# Patient Record
Sex: FEMALE | Race: WHITE | NOT HISPANIC OR LATINO | Employment: OTHER | URBAN - METROPOLITAN AREA
[De-identification: names, ages, dates, MRNs, and addresses within clinical notes are randomized per-mention and may not be internally consistent; named-entity substitution may affect disease eponyms.]

---

## 2024-10-05 ENCOUNTER — HOSPITAL ENCOUNTER (INPATIENT)
Facility: HOSPITAL | Age: 84
End: 2024-10-05
Attending: EMERGENCY MEDICINE | Admitting: FAMILY MEDICINE
Payer: MEDICARE

## 2024-10-05 ENCOUNTER — APPOINTMENT (OUTPATIENT)
Dept: RADIOLOGY | Facility: HOSPITAL | Age: 84
End: 2024-10-05
Payer: MEDICARE

## 2024-10-05 ENCOUNTER — APPOINTMENT (OUTPATIENT)
Dept: CARDIOLOGY | Facility: HOSPITAL | Age: 84
End: 2024-10-05
Payer: MEDICARE

## 2024-10-05 DIAGNOSIS — J18.9 PNEUMONIA, UNSPECIFIED ORGANISM: Primary | ICD-10-CM

## 2024-10-05 PROBLEM — A80.9 POLIO (HHS-HCC): Status: ACTIVE | Noted: 2024-10-05

## 2024-10-05 PROBLEM — M97.9XXA FRACTURE OF BONE ADJACENT TO PROSTHESIS: Status: ACTIVE | Noted: 2024-10-05

## 2024-10-05 PROBLEM — K21.9 GASTROESOPHAGEAL REFLUX DISEASE WITHOUT ESOPHAGITIS: Status: ACTIVE | Noted: 2021-01-25

## 2024-10-05 PROBLEM — D64.9 ANEMIA, UNSPECIFIED: Status: ACTIVE | Noted: 2021-06-17

## 2024-10-05 PROBLEM — F33.9 MAJOR DEPRESSIVE DISORDER, RECURRENT, UNSPECIFIED: Status: ACTIVE | Noted: 2021-06-17

## 2024-10-05 PROBLEM — M05.9 RHEUMATOID ARTHRITIS WITH RHEUMATOID FACTOR: Status: ACTIVE | Noted: 2021-06-17

## 2024-10-05 PROBLEM — I10 HYPERTENSION: Status: ACTIVE | Noted: 2023-03-08

## 2024-10-05 PROBLEM — E78.5 HYPERLIPIDEMIA: Status: ACTIVE | Noted: 2023-03-08

## 2024-10-05 PROBLEM — M19.90 OSTEOARTHRITIS: Status: ACTIVE | Noted: 2022-03-23

## 2024-10-05 LAB
ALBUMIN SERPL BCP-MCNC: 4.6 G/DL (ref 3.4–5)
ALP SERPL-CCNC: 60 U/L (ref 33–136)
ALT SERPL W P-5'-P-CCNC: 23 U/L (ref 7–45)
ANION GAP SERPL CALC-SCNC: 16 MMOL/L (ref 10–20)
APPEARANCE UR: CLEAR
AST SERPL W P-5'-P-CCNC: 60 U/L (ref 9–39)
BASOPHILS # BLD AUTO: 0.01 X10*3/UL (ref 0–0.1)
BASOPHILS NFR BLD AUTO: 0.1 %
BILIRUB SERPL-MCNC: 0.6 MG/DL (ref 0–1.2)
BILIRUB UR STRIP.AUTO-MCNC: NEGATIVE MG/DL
BUN SERPL-MCNC: 16 MG/DL (ref 6–23)
CALCIUM SERPL-MCNC: 9.1 MG/DL (ref 8.6–10.3)
CHLORIDE SERPL-SCNC: 100 MMOL/L (ref 98–107)
CO2 SERPL-SCNC: 23 MMOL/L (ref 21–32)
COLOR UR: YELLOW
CREAT SERPL-MCNC: 1.06 MG/DL (ref 0.5–1.05)
EGFRCR SERPLBLD CKD-EPI 2021: 52 ML/MIN/1.73M*2
EOSINOPHIL # BLD AUTO: 0.03 X10*3/UL (ref 0–0.4)
EOSINOPHIL NFR BLD AUTO: 0.3 %
ERYTHROCYTE [DISTWIDTH] IN BLOOD BY AUTOMATED COUNT: 13.2 % (ref 11.5–14.5)
FLUAV RNA RESP QL NAA+PROBE: NOT DETECTED
FLUBV RNA RESP QL NAA+PROBE: NOT DETECTED
GLUCOSE SERPL-MCNC: 127 MG/DL (ref 74–99)
GLUCOSE UR STRIP.AUTO-MCNC: NORMAL MG/DL
HCT VFR BLD AUTO: 39.3 % (ref 36–46)
HGB BLD-MCNC: 13.2 G/DL (ref 12–16)
HOLD SPECIMEN: NORMAL
IMM GRANULOCYTES # BLD AUTO: 0.04 X10*3/UL (ref 0–0.5)
IMM GRANULOCYTES NFR BLD AUTO: 0.4 % (ref 0–0.9)
KETONES UR STRIP.AUTO-MCNC: ABNORMAL MG/DL
LACTATE SERPL-SCNC: 1.9 MMOL/L (ref 0.4–2)
LACTATE SERPL-SCNC: 2.4 MMOL/L (ref 0.4–2)
LEUKOCYTE ESTERASE UR QL STRIP.AUTO: NEGATIVE
LYMPHOCYTES # BLD AUTO: 0.52 X10*3/UL (ref 0.8–3)
LYMPHOCYTES NFR BLD AUTO: 5.6 %
MCH RBC QN AUTO: 32.8 PG (ref 26–34)
MCHC RBC AUTO-ENTMCNC: 33.6 G/DL (ref 32–36)
MCV RBC AUTO: 98 FL (ref 80–100)
MONOCYTES # BLD AUTO: 0.79 X10*3/UL (ref 0.05–0.8)
MONOCYTES NFR BLD AUTO: 8.5 %
MUCOUS THREADS #/AREA URNS AUTO: NORMAL /LPF
NEUTROPHILS # BLD AUTO: 7.89 X10*3/UL (ref 1.6–5.5)
NEUTROPHILS NFR BLD AUTO: 85.1 %
NITRITE UR QL STRIP.AUTO: NEGATIVE
NRBC BLD-RTO: 0 /100 WBCS (ref 0–0)
PH UR STRIP.AUTO: 5.5 [PH]
PLATELET # BLD AUTO: 166 X10*3/UL (ref 150–450)
POTASSIUM SERPL-SCNC: 4.7 MMOL/L (ref 3.5–5.3)
PROT SERPL-MCNC: 8 G/DL (ref 6.4–8.2)
PROT UR STRIP.AUTO-MCNC: ABNORMAL MG/DL
RBC # BLD AUTO: 4.03 X10*6/UL (ref 4–5.2)
RBC # UR STRIP.AUTO: ABNORMAL /UL
RBC #/AREA URNS AUTO: NORMAL /HPF
SARS-COV-2 RNA RESP QL NAA+PROBE: NOT DETECTED
SODIUM SERPL-SCNC: 134 MMOL/L (ref 136–145)
SP GR UR STRIP.AUTO: 1.02
UROBILINOGEN UR STRIP.AUTO-MCNC: NORMAL MG/DL
WBC # BLD AUTO: 9.3 X10*3/UL (ref 4.4–11.3)
WBC #/AREA URNS AUTO: NORMAL /HPF

## 2024-10-05 PROCEDURE — 96361 HYDRATE IV INFUSION ADD-ON: CPT

## 2024-10-05 PROCEDURE — 2500000004 HC RX 250 GENERAL PHARMACY W/ HCPCS (ALT 636 FOR OP/ED): Performed by: EMERGENCY MEDICINE

## 2024-10-05 PROCEDURE — 36415 COLL VENOUS BLD VENIPUNCTURE: CPT | Performed by: EMERGENCY MEDICINE

## 2024-10-05 PROCEDURE — 96374 THER/PROPH/DIAG INJ IV PUSH: CPT

## 2024-10-05 PROCEDURE — 2500000001 HC RX 250 WO HCPCS SELF ADMINISTERED DRUGS (ALT 637 FOR MEDICARE OP): Performed by: EMERGENCY MEDICINE

## 2024-10-05 PROCEDURE — 85025 COMPLETE CBC W/AUTO DIFF WBC: CPT | Performed by: EMERGENCY MEDICINE

## 2024-10-05 PROCEDURE — 71045 X-RAY EXAM CHEST 1 VIEW: CPT

## 2024-10-05 PROCEDURE — 2500000001 HC RX 250 WO HCPCS SELF ADMINISTERED DRUGS (ALT 637 FOR MEDICARE OP)

## 2024-10-05 PROCEDURE — 87040 BLOOD CULTURE FOR BACTERIA: CPT | Mod: PORLAB | Performed by: EMERGENCY MEDICINE

## 2024-10-05 PROCEDURE — 80053 COMPREHEN METABOLIC PANEL: CPT | Performed by: EMERGENCY MEDICINE

## 2024-10-05 PROCEDURE — 71045 X-RAY EXAM CHEST 1 VIEW: CPT | Mod: FOREIGN READ | Performed by: RADIOLOGY

## 2024-10-05 PROCEDURE — 1200000002 HC GENERAL ROOM WITH TELEMETRY DAILY

## 2024-10-05 PROCEDURE — 83036 HEMOGLOBIN GLYCOSYLATED A1C: CPT

## 2024-10-05 PROCEDURE — 87899 AGENT NOS ASSAY W/OPTIC: CPT | Mod: PORLAB

## 2024-10-05 PROCEDURE — 87502 INFLUENZA DNA AMP PROBE: CPT | Performed by: EMERGENCY MEDICINE

## 2024-10-05 PROCEDURE — 93005 ELECTROCARDIOGRAM TRACING: CPT

## 2024-10-05 PROCEDURE — 87635 SARS-COV-2 COVID-19 AMP PRB: CPT | Performed by: EMERGENCY MEDICINE

## 2024-10-05 PROCEDURE — 74177 CT ABD & PELVIS W/CONTRAST: CPT

## 2024-10-05 PROCEDURE — 84075 ASSAY ALKALINE PHOSPHATASE: CPT | Performed by: EMERGENCY MEDICINE

## 2024-10-05 PROCEDURE — 74177 CT ABD & PELVIS W/CONTRAST: CPT | Mod: FOREIGN READ | Performed by: RADIOLOGY

## 2024-10-05 PROCEDURE — 71260 CT THORAX DX C+: CPT | Mod: FOREIGN READ | Performed by: RADIOLOGY

## 2024-10-05 PROCEDURE — 2550000001 HC RX 255 CONTRASTS: Performed by: EMERGENCY MEDICINE

## 2024-10-05 PROCEDURE — 83605 ASSAY OF LACTIC ACID: CPT | Performed by: EMERGENCY MEDICINE

## 2024-10-05 PROCEDURE — 99223 1ST HOSP IP/OBS HIGH 75: CPT

## 2024-10-05 PROCEDURE — 2500000004 HC RX 250 GENERAL PHARMACY W/ HCPCS (ALT 636 FOR OP/ED)

## 2024-10-05 PROCEDURE — 81001 URINALYSIS AUTO W/SCOPE: CPT | Performed by: EMERGENCY MEDICINE

## 2024-10-05 PROCEDURE — 99285 EMERGENCY DEPT VISIT HI MDM: CPT | Mod: 25

## 2024-10-05 PROCEDURE — 87449 NOS EACH ORGANISM AG IA: CPT | Mod: PORLAB

## 2024-10-05 RX ORDER — ENOXAPARIN SODIUM 100 MG/ML
40 INJECTION SUBCUTANEOUS EVERY 24 HOURS
Status: DISPENSED | OUTPATIENT
Start: 2024-10-05

## 2024-10-05 RX ORDER — PANTOPRAZOLE SODIUM 40 MG/1
40 TABLET, DELAYED RELEASE ORAL
Status: DISPENSED | OUTPATIENT
Start: 2024-10-06

## 2024-10-05 RX ORDER — ONDANSETRON HYDROCHLORIDE 2 MG/ML
4 INJECTION, SOLUTION INTRAVENOUS EVERY 6 HOURS PRN
Status: ACTIVE | OUTPATIENT
Start: 2024-10-05

## 2024-10-05 RX ORDER — POLYETHYLENE GLYCOL 3350 17 G/17G
17 POWDER, FOR SOLUTION ORAL DAILY PRN
Status: ACTIVE | OUTPATIENT
Start: 2024-10-05

## 2024-10-05 RX ORDER — GUAIFENESIN 600 MG/1
600 TABLET, EXTENDED RELEASE ORAL EVERY 12 HOURS PRN
Status: ACTIVE | OUTPATIENT
Start: 2024-10-05

## 2024-10-05 RX ORDER — PANTOPRAZOLE SODIUM 40 MG/10ML
40 INJECTION, POWDER, LYOPHILIZED, FOR SOLUTION INTRAVENOUS
Status: DISPENSED | OUTPATIENT
Start: 2024-10-06

## 2024-10-05 RX ORDER — IPRATROPIUM BROMIDE AND ALBUTEROL SULFATE 2.5; .5 MG/3ML; MG/3ML
3 SOLUTION RESPIRATORY (INHALATION) EVERY 2 HOUR PRN
Status: ACTIVE | OUTPATIENT
Start: 2024-10-05

## 2024-10-05 RX ORDER — ATORVASTATIN CALCIUM 10 MG/1
10 TABLET, FILM COATED ORAL DAILY
Status: ON HOLD | COMMUNITY
Start: 2024-08-19

## 2024-10-05 RX ORDER — MIRTAZAPINE 15 MG/1
7.5 TABLET, FILM COATED ORAL NIGHTLY
Status: DISPENSED | OUTPATIENT
Start: 2024-10-05

## 2024-10-05 RX ORDER — UPADACITINIB 15 MG/1
1 TABLET, EXTENDED RELEASE ORAL DAILY
Status: ON HOLD | COMMUNITY
Start: 2021-03-01

## 2024-10-05 RX ORDER — TALC
3 POWDER (GRAM) TOPICAL NIGHTLY PRN
Status: DISPENSED | OUTPATIENT
Start: 2024-10-05

## 2024-10-05 RX ORDER — MEMANTINE HYDROCHLORIDE 5 MG/1
5 TABLET ORAL DAILY
Status: DISPENSED | OUTPATIENT
Start: 2024-10-05

## 2024-10-05 RX ORDER — IPRATROPIUM BROMIDE AND ALBUTEROL SULFATE 2.5; .5 MG/3ML; MG/3ML
3 SOLUTION RESPIRATORY (INHALATION)
Status: DISCONTINUED | OUTPATIENT
Start: 2024-10-05 | End: 2024-10-05

## 2024-10-05 RX ORDER — ATORVASTATIN CALCIUM 10 MG/1
10 TABLET, FILM COATED ORAL NIGHTLY
Status: DISPENSED | OUTPATIENT
Start: 2024-10-05

## 2024-10-05 RX ORDER — DONEPEZIL HYDROCHLORIDE 10 MG/1
10 TABLET, FILM COATED ORAL NIGHTLY
Status: ON HOLD | COMMUNITY

## 2024-10-05 RX ORDER — ACETAMINOPHEN 325 MG/1
650 TABLET ORAL ONCE
Status: COMPLETED | OUTPATIENT
Start: 2024-10-05 | End: 2024-10-05

## 2024-10-05 RX ORDER — MIRTAZAPINE 15 MG/1
15 TABLET, FILM COATED ORAL NIGHTLY
Status: ON HOLD | COMMUNITY
Start: 2024-08-19

## 2024-10-05 RX ORDER — ACETAMINOPHEN 325 MG/1
650 TABLET ORAL EVERY 4 HOURS PRN
Status: DISPENSED | OUTPATIENT
Start: 2024-10-05

## 2024-10-05 RX ORDER — MEMANTINE HYDROCHLORIDE 5 MG/1
5 TABLET ORAL DAILY
Status: ON HOLD | COMMUNITY

## 2024-10-05 RX ADMIN — AZITHROMYCIN MONOHYDRATE 500 MG: 500 INJECTION, POWDER, LYOPHILIZED, FOR SOLUTION INTRAVENOUS at 15:14

## 2024-10-05 RX ADMIN — ENOXAPARIN SODIUM 40 MG: 40 INJECTION SUBCUTANEOUS at 21:51

## 2024-10-05 RX ADMIN — Medication 3 MG: at 21:49

## 2024-10-05 RX ADMIN — ATORVASTATIN CALCIUM 10 MG: 10 TABLET, FILM COATED ORAL at 21:49

## 2024-10-05 RX ADMIN — ACETAMINOPHEN 650 MG: 325 TABLET ORAL at 10:58

## 2024-10-05 RX ADMIN — MIRTAZAPINE 7.5 MG: 15 TABLET, FILM COATED ORAL at 21:49

## 2024-10-05 RX ADMIN — IOHEXOL 75 ML: 350 INJECTION, SOLUTION INTRAVENOUS at 11:56

## 2024-10-05 RX ADMIN — MEMANTINE 5 MG: 5 TABLET ORAL at 21:51

## 2024-10-05 RX ADMIN — SODIUM CHLORIDE 1000 ML: 9 INJECTION, SOLUTION INTRAVENOUS at 07:38

## 2024-10-05 RX ADMIN — DOXYCYCLINE 100 MG: 100 INJECTION, POWDER, LYOPHILIZED, FOR SOLUTION INTRAVENOUS at 21:48

## 2024-10-05 SDOH — ECONOMIC STABILITY: TRANSPORTATION INSECURITY
IN THE PAST 12 MONTHS, HAS THE LACK OF TRANSPORTATION KEPT YOU FROM MEDICAL APPOINTMENTS OR FROM GETTING MEDICATIONS?: PATIENT UNABLE TO ANSWER

## 2024-10-05 SDOH — ECONOMIC STABILITY: INCOME INSECURITY
IN THE LAST 12 MONTHS, WAS THERE A TIME WHEN YOU WERE NOT ABLE TO PAY THE MORTGAGE OR RENT ON TIME?: PATIENT UNABLE TO ANSWER

## 2024-10-05 SDOH — ECONOMIC STABILITY: TRANSPORTATION INSECURITY
IN THE PAST 12 MONTHS, HAS LACK OF TRANSPORTATION KEPT YOU FROM MEETINGS, WORK, OR FROM GETTING THINGS NEEDED FOR DAILY LIVING?: PATIENT UNABLE TO ANSWER

## 2024-10-05 SDOH — HEALTH STABILITY: PHYSICAL HEALTH: ON AVERAGE, HOW MANY MINUTES DO YOU ENGAGE IN EXERCISE AT THIS LEVEL?: PATIENT UNABLE TO ANSWER

## 2024-10-05 SDOH — ECONOMIC STABILITY: FOOD INSECURITY
WITHIN THE PAST 12 MONTHS, YOU WORRIED THAT YOUR FOOD WOULD RUN OUT BEFORE YOU GOT MONEY TO BUY MORE.: PATIENT UNABLE TO ANSWER

## 2024-10-05 SDOH — ECONOMIC STABILITY: TRANSPORTATION INSECURITY
IN THE PAST 12 MONTHS, HAS LACK OF TRANSPORTATION KEPT YOU FROM MEDICAL APPOINTMENTS OR FROM GETTING MEDICATIONS?: PATIENT UNABLE TO ANSWER

## 2024-10-05 SDOH — SOCIAL STABILITY: SOCIAL INSECURITY
WITHIN THE LAST YEAR, HAVE TO BEEN RAPED OR FORCED TO HAVE ANY KIND OF SEXUAL ACTIVITY BY YOUR PARTNER OR EX-PARTNER?: PATIENT UNABLE TO ANSWER

## 2024-10-05 SDOH — ECONOMIC STABILITY: FOOD INSECURITY
HOW HARD IS IT FOR YOU TO PAY FOR THE VERY BASICS LIKE FOOD, HOUSING, MEDICAL CARE, AND HEATING?: PATIENT UNABLE TO ANSWER

## 2024-10-05 SDOH — ECONOMIC STABILITY: HOUSING INSECURITY: IN THE PAST 12 MONTHS, HOW MANY TIMES HAVE YOU MOVED WHERE YOU WERE LIVING?: 0

## 2024-10-05 SDOH — SOCIAL STABILITY: SOCIAL INSECURITY
WITHIN THE LAST YEAR, HAVE YOU BEEN KICKED, HIT, SLAPPED, OR OTHERWISE PHYSICALLY HURT BY YOUR PARTNER OR EX-PARTNER?: PATIENT UNABLE TO ANSWER

## 2024-10-05 SDOH — ECONOMIC STABILITY: FOOD INSECURITY
WITHIN THE PAST 12 MONTHS, YOU WORRIED THAT YOUR FOOD WOULD RUN OUT BEFORE YOU GOT THE MONEY TO BUY MORE.: PATIENT UNABLE TO ANSWER

## 2024-10-05 SDOH — ECONOMIC STABILITY: INCOME INSECURITY
IN THE PAST 12 MONTHS HAS THE ELECTRIC, GAS, OIL, OR WATER COMPANY THREATENED TO SHUT OFF SERVICES IN YOUR HOME?: PATIENT UNABLE TO ANSWER

## 2024-10-05 SDOH — ECONOMIC STABILITY: HOUSING INSECURITY: AT ANY TIME IN THE PAST 12 MONTHS, WERE YOU HOMELESS OR LIVING IN A SHELTER (INCLUDING NOW)?: PATIENT UNABLE TO ANSWER

## 2024-10-05 SDOH — ECONOMIC STABILITY: FOOD INSECURITY
WITHIN THE PAST 12 MONTHS, THE FOOD YOU BOUGHT JUST DIDN'T LAST AND YOU DIDN'T HAVE MONEY TO GET MORE.: PATIENT UNABLE TO ANSWER

## 2024-10-05 SDOH — SOCIAL STABILITY: SOCIAL INSECURITY: WITHIN THE LAST YEAR, HAVE YOU BEEN AFRAID OF YOUR PARTNER OR EX-PARTNER?: PATIENT UNABLE TO ANSWER

## 2024-10-05 SDOH — SOCIAL STABILITY: SOCIAL INSECURITY: HAVE YOU HAD THOUGHTS OF HARMING ANYONE ELSE?: NO

## 2024-10-05 SDOH — SOCIAL STABILITY: SOCIAL INSECURITY
WITHIN THE LAST YEAR, HAVE YOU BEEN HUMILIATED OR EMOTIONALLY ABUSED IN OTHER WAYS BY YOUR PARTNER OR EX-PARTNER?: PATIENT UNABLE TO ANSWER

## 2024-10-05 SDOH — HEALTH STABILITY: MENTAL HEALTH
STRESS IS WHEN SOMEONE FEELS TENSE, NERVOUS, ANXIOUS, OR CAN'T SLEEP AT NIGHT BECAUSE THEIR MIND IS TROUBLED. HOW STRESSED ARE YOU?: PATIENT UNABLE TO ANSWER

## 2024-10-05 SDOH — HEALTH STABILITY: PHYSICAL HEALTH
ON AVERAGE, HOW MANY DAYS PER WEEK DO YOU ENGAGE IN MODERATE TO STRENUOUS EXERCISE (LIKE A BRISK WALK)?: PATIENT UNABLE TO ANSWER

## 2024-10-05 SDOH — SOCIAL STABILITY: SOCIAL INSECURITY: WERE YOU ABLE TO COMPLETE ALL THE BEHAVIORAL HEALTH SCREENINGS?: YES

## 2024-10-05 SDOH — SOCIAL STABILITY: SOCIAL INSECURITY
WITHIN THE LAST YEAR, HAVE YOU BEEN RAPED OR FORCED TO HAVE ANY KIND OF SEXUAL ACTIVITY BY YOUR PARTNER OR EX-PARTNER?: PATIENT UNABLE TO ANSWER

## 2024-10-05 SDOH — ECONOMIC STABILITY: INCOME INSECURITY
IN THE PAST 12 MONTHS, HAS THE ELECTRIC, GAS, OIL, OR WATER COMPANY THREATENED TO SHUT OFF SERVICE IN YOUR HOME?: PATIENT UNABLE TO ANSWER

## 2024-10-05 SDOH — HEALTH STABILITY: MENTAL HEALTH
DO YOU FEEL STRESS - TENSE, RESTLESS, NERVOUS, OR ANXIOUS, OR UNABLE TO SLEEP AT NIGHT BECAUSE YOUR MIND IS TROUBLED ALL THE TIME - THESE DAYS?: PATIENT UNABLE TO ANSWER

## 2024-10-05 ASSESSMENT — ENCOUNTER SYMPTOMS
NAUSEA: 0
FEVER: 1
ABDOMINAL PAIN: 0
FATIGUE: 0
CHEST TIGHTNESS: 0
COUGH: 0
CHILLS: 0
PALPITATIONS: 0
HEADACHES: 0
FLANK PAIN: 0
DIARRHEA: 0
WOUND: 0
WHEEZING: 0
TREMORS: 0
SHORTNESS OF BREATH: 0
CONSTIPATION: 0
HEMATURIA: 0
WEAKNESS: 1
BACK PAIN: 0
VOMITING: 0
JOINT SWELLING: 0

## 2024-10-05 ASSESSMENT — ACTIVITIES OF DAILY LIVING (ADL)
GROOMING: INDEPENDENT
LACK_OF_TRANSPORTATION: PATIENT UNABLE TO ANSWER
ADEQUATE_TO_COMPLETE_ADL: YES
TOILETING: INDEPENDENT
LACK_OF_TRANSPORTATION: NO
PATIENT'S MEMORY ADEQUATE TO SAFELY COMPLETE DAILY ACTIVITIES?: YES
LACK_OF_TRANSPORTATION: PATIENT UNABLE TO ANSWER
LACK_OF_TRANSPORTATION: PATIENT UNABLE TO ANSWER
ASSISTIVE_DEVICE: DENTURES UPPER;DENTURES PARTIAL
HEARING - RIGHT EAR: FUNCTIONAL
BATHING: INDEPENDENT
DRESSING YOURSELF: INDEPENDENT
WALKS IN HOME: INDEPENDENT
LACK_OF_TRANSPORTATION: PATIENT UNABLE TO ANSWER
JUDGMENT_ADEQUATE_SAFELY_COMPLETE_DAILY_ACTIVITIES: YES
FEEDING YOURSELF: INDEPENDENT
HEARING - LEFT EAR: FUNCTIONAL

## 2024-10-05 ASSESSMENT — COGNITIVE AND FUNCTIONAL STATUS - GENERAL
CLIMB 3 TO 5 STEPS WITH RAILING: A LITTLE
PATIENT BASELINE BEDBOUND: NO
MOBILITY SCORE: 17
DRESSING REGULAR LOWER BODY CLOTHING: A LITTLE
MOVING FROM LYING ON BACK TO SITTING ON SIDE OF FLAT BED WITH BEDRAILS: A LITTLE
WALKING IN HOSPITAL ROOM: A LITTLE
DAILY ACTIVITIY SCORE: 24
DAILY ACTIVITIY SCORE: 20
HELP NEEDED FOR BATHING: A LITTLE
MOBILITY SCORE: 23
CLIMB 3 TO 5 STEPS WITH RAILING: A LOT
TURNING FROM BACK TO SIDE WHILE IN FLAT BAD: A LITTLE
MOVING TO AND FROM BED TO CHAIR: A LITTLE
STANDING UP FROM CHAIR USING ARMS: A LITTLE
TOILETING: A LITTLE
DRESSING REGULAR UPPER BODY CLOTHING: A LITTLE

## 2024-10-05 ASSESSMENT — LIFESTYLE VARIABLES
HAVE PEOPLE ANNOYED YOU BY CRITICIZING YOUR DRINKING: NO
EVER FELT BAD OR GUILTY ABOUT YOUR DRINKING: NO
HAVE YOU EVER FELT YOU SHOULD CUT DOWN ON YOUR DRINKING: NO
HOW OFTEN DO YOU HAVE A DRINK CONTAINING ALCOHOL: NEVER
PRESCIPTION_ABUSE_PAST_12_MONTHS: NO
AUDIT-C TOTAL SCORE: 0
TOTAL SCORE: 0
EVER HAD A DRINK FIRST THING IN THE MORNING TO STEADY YOUR NERVES TO GET RID OF A HANGOVER: NO
SKIP TO QUESTIONS 9-10: 1
AUDIT-C TOTAL SCORE: 0
HOW OFTEN DO YOU HAVE 6 OR MORE DRINKS ON ONE OCCASION: NEVER
HOW MANY STANDARD DRINKS CONTAINING ALCOHOL DO YOU HAVE ON A TYPICAL DAY: PATIENT DOES NOT DRINK
SUBSTANCE_ABUSE_PAST_12_MONTHS: NO

## 2024-10-05 ASSESSMENT — PAIN - FUNCTIONAL ASSESSMENT
PAIN_FUNCTIONAL_ASSESSMENT: 0-10

## 2024-10-05 ASSESSMENT — PAIN SCALES - GENERAL
PAINLEVEL_OUTOF10: 0 - NO PAIN

## 2024-10-05 ASSESSMENT — PATIENT HEALTH QUESTIONNAIRE - PHQ9
SUM OF ALL RESPONSES TO PHQ9 QUESTIONS 1 & 2: 0
2. FEELING DOWN, DEPRESSED OR HOPELESS: NOT AT ALL
1. LITTLE INTEREST OR PLEASURE IN DOING THINGS: NOT AT ALL

## 2024-10-05 ASSESSMENT — COLUMBIA-SUICIDE SEVERITY RATING SCALE - C-SSRS
6. HAVE YOU EVER DONE ANYTHING, STARTED TO DO ANYTHING, OR PREPARED TO DO ANYTHING TO END YOUR LIFE?: NO
1. IN THE PAST MONTH, HAVE YOU WISHED YOU WERE DEAD OR WISHED YOU COULD GO TO SLEEP AND NOT WAKE UP?: NO
2. HAVE YOU ACTUALLY HAD ANY THOUGHTS OF KILLING YOURSELF?: NO

## 2024-10-05 NOTE — ED PROVIDER NOTES
HPI   No chief complaint on file.      Patient presents to the emergency department secondary to fall.  Patient had a mechanical fall at her assisted living facility today.  She states that she just lost her balance.  She uses a walker at baseline.  She has felt weaker over the last few days.  She has a low-grade temperature of 100.4.  She is a cough with no sputum production.  She denies headache or neck pain.  No chest pain.  No abdominal pain.  No nausea or vomiting.  No change in bowel movements.  No change in urination.  She denies urinary frequency.  No extremity pain.  She actually denies any injury from the fall itself.  She is not on any blood thinning medications.                          Aragon Coma Scale Score: 14                  Patient History   No past medical history on file.  Past Surgical History:   Procedure Laterality Date    OTHER SURGICAL HISTORY  07/08/2020    Hysterectomy    OTHER SURGICAL HISTORY  07/08/2020    Knee replacement     No family history on file.  Social History     Tobacco Use    Smoking status: Not on file    Smokeless tobacco: Not on file   Substance Use Topics    Alcohol use: Not on file    Drug use: Not on file       Physical Exam   ED Triage Vitals [10/05/24 0722]   Temperature Heart Rate Respirations BP   (!) 38 °C (100.4 °F) (!) 112 18 128/84      Pulse Ox Temp src Heart Rate Source Patient Position   96 % -- -- --      BP Location FiO2 (%)     -- --       Physical Exam  Vitals and nursing note reviewed.   Constitutional:       Appearance: Normal appearance.   HENT:      Head: Normocephalic.      Right Ear: Tympanic membrane normal.      Left Ear: Tympanic membrane normal.      Nose: Nose normal.      Mouth/Throat:      Mouth: Mucous membranes are moist.   Eyes:      Extraocular Movements: Extraocular movements intact.      Pupils: Pupils are equal, round, and reactive to light.   Cardiovascular:      Rate and Rhythm: Normal rate and regular rhythm.      Pulses: Normal  pulses.      Heart sounds: Normal heart sounds.   Pulmonary:      Effort: Pulmonary effort is normal.      Breath sounds: Normal breath sounds. No wheezing, rhonchi or rales.   Abdominal:      General: Abdomen is flat. Bowel sounds are normal.      Palpations: Abdomen is soft.      Tenderness: There is no abdominal tenderness. There is no guarding or rebound.   Musculoskeletal:         General: No tenderness or deformity. Normal range of motion.      Cervical back: Normal range of motion.      Right lower leg: No edema.      Left lower leg: No edema.   Skin:     General: Skin is warm and dry.      Capillary Refill: Capillary refill takes less than 2 seconds.   Neurological:      General: No focal deficit present.      Mental Status: She is alert. She is disoriented.      Comments: Patient knows her name and where she is at.  She does not know the month.  She does know the president.   Psychiatric:         Mood and Affect: Mood normal.         Behavior: Behavior normal.       Labs Reviewed   CBC WITH AUTO DIFFERENTIAL - Abnormal       Result Value    WBC 9.3      nRBC 0.0      RBC 4.03      Hemoglobin 13.2      Hematocrit 39.3      MCV 98      MCH 32.8      MCHC 33.6      RDW 13.2      Platelets 166      Neutrophils % 85.1      Immature Granulocytes %, Automated 0.4      Lymphocytes % 5.6      Monocytes % 8.5      Eosinophils % 0.3      Basophils % 0.1      Neutrophils Absolute 7.89 (*)     Immature Granulocytes Absolute, Automated 0.04      Lymphocytes Absolute 0.52 (*)     Monocytes Absolute 0.79      Eosinophils Absolute 0.03      Basophils Absolute 0.01     COMPREHENSIVE METABOLIC PANEL - Abnormal    Glucose 127 (*)     Sodium 134 (*)     Potassium 4.7      Chloride 100      Bicarbonate 23      Anion Gap 16      Urea Nitrogen 16      Creatinine 1.06 (*)     eGFR 52 (*)     Calcium 9.1      Albumin 4.6      Alkaline Phosphatase 60      Total Protein 8.0      AST 60 (*)     Bilirubin, Total 0.6      ALT 23      LACTATE - Abnormal    Lactate 2.4 (*)     Narrative:     Venipuncture immediately after or during the administration of Metamizole may lead to falsely low results. Testing should be performed immediately prior to Metamizole dosing.   URINALYSIS WITH REFLEX CULTURE AND MICROSCOPIC - Abnormal    Color, Urine Yellow      Appearance, Urine Clear      Specific Gravity, Urine 1.023      pH, Urine 5.5      Protein, Urine 70 (1+) (*)     Glucose, Urine Normal      Blood, Urine 1.0 (3+) (*)     Ketones, Urine TRACE (*)     Bilirubin, Urine NEGATIVE      Urobilinogen, Urine Normal      Nitrite, Urine NEGATIVE      Leukocyte Esterase, Urine NEGATIVE     SARS-COV-2 PCR - Normal    Coronavirus 2019, PCR Not Detected      Narrative:     This assay has received FDA Emergency Use Authorization (EUA) and is only authorized for the duration of time that circumstances exist to justify the authorization of the emergency use of in vitro diagnostic tests for the detection of SARS-CoV-2 virus and/or diagnosis of COVID-19 infection under section 564(b)(1) of the Act, 21 U.S.C. 360bbb-3(b)(1). This assay is an in vitro diagnostic nucleic acid amplification test for the qualitative detection of SARS-CoV-2 from nasopharyngeal specimens and has been validated for use at [facility]. Negative results do not preclude COVID-19 infections and should not be used as the sole basis for diagnosis, treatment, or other management decisions.     LACTATE - Normal    Lactate 1.9      Narrative:     Venipuncture immediately after or during the administration of Metamizole may lead to falsely low results. Testing should be performed immediately prior to Metamizole dosing.   INFLUENZA A AND B PCR - Normal    Flu A Result Not Detected      Flu B Result Not Detected      Narrative:     This assay is an in vitro diagnostic multiplex nucleic acid amplification test for the detection and discrimination of Influenza A & B from  nasopharyngeal specimens, and has been validated for use at [facility]. Negative results do not preclude Influenza A/B infections, and should not be used as the sole basis for diagnosis, treatment, or other management decisions. If Influenza A/B and RSV PCR results are negative, testing for Parainfluenza virus, Adenovirus and Metapneumovirus is routinely performed for [facility] pediatric oncology and intensive care inpatients, and is available on other patients by placing an add-on request.   BLOOD CULTURE   BLOOD CULTURE   RESPIRATORY CULTURE/SMEAR   LEGIONELLA ANTIGEN, URINE   STREPTOCOCCUS PNEUMONIAE ANTIGEN, URINE   URINALYSIS WITH REFLEX CULTURE AND MICROSCOPIC    Narrative:     The following orders were created for panel order Urinalysis with Reflex Culture and Microscopic.  Procedure                               Abnormality         Status                     ---------                               -----------         ------                     Urinalysis with Reflex C...[[id number]]  Abnormal            Final result               Extra Urine Gray Tube[[id number]]                            In process                   Please view results for these tests on the individual orders.   EXTRA URINE GRAY TUBE   HEMOGLOBIN A1C   URINALYSIS MICROSCOPIC WITH REFLEX CULTURE    WBC, Urine 1-5      RBC, Urine 3-5      Mucus, Urine FEW       Pain Management Panel  More data exists         Latest Ref Rng & Units 3/8/2021 1/28/2021   Pain Management Panel   Amphetamine Screen, Urine NEGATIVE PRESUMPTIVE NEGATIVE  PRESUMPTIVE NEGATIVE    Barbiturate Screen, Urine NEGATIVE PRESUMPTIVE NEGATIVE  PRESUMPTIVE NEGATIVE    BUPRENORPHINE ,URINE ng/mL - <2    Codeine IgE Cutoff <50 ng/mL - <50    Alcohol, Urine <20 mg/dL - <10    Fentanyl Screen, Urine NEGATIVE PRESUMPTIVE NEGATIVE  -   Hydromorphone Urine Cutoff <25 ng/mL - 1,815    Methadone Screen, Urine NEGATIVE PRESUMPTIVE NEGATIVE  -   Morphine  Cutoff <50  ng/mL - <50       Details                 CT chest abdomen pelvis w IV contrast   Final Result   1. There is patchy airspace disease with early lung consolidation seen   adjacent to the left side of the aortic arch and within the left lower   lung posteriorly concerning for multifocal pneumonia.   2. Cholelithiasis without evidence of acute cholecystitis.   3. Multiple left parapelvic cysts, with the largest measuring up to   1.8 cm.   4. Significant diverticular disease of the sigmoid colon without acute   diverticulitis.   5. Left hip replacement in satisfactory anatomic alignment.   6. Mild compression fracture of the upper endplate of L1; age   indeterminate.   7. Small sclerotic bone lesion of 0.3 x 0.9 cm within the L1 vertebral   body.    Signed by Abhijeet Pacheco MD      XR chest 1 view   Final Result   No acute process.   Signed by Kristopher Garcia MD        ED Course & MDM   Diagnoses as of 10/05/24 1545   Pneumonia, unspecified organism       Medical Decision Making  Patient presents secondary to weakness and fall.  Patient is found to have a low-grade fever.  Patient is evaluated for acute infectious process.  Differential diagnosis includes COVID, pneumonia, viral infection, dehydration, electrolyte abnormality or other acute cause.  Chest x-ray EKG and laboratory workup is performed.  Initial workup at this time is unremarkable.  COVID and influenza testing are negative.  Chest x-ray is negative.  Lactate was mildly elevated at 2.4 but improved to 1.9 after IV hydration.  Patient then spiked a temperature up to 103.  At that time CT chest abdomen pelvis is ordered to evaluate for cause of infection.  Lab work is otherwise unremarkable with negative white blood cell counts.    Time of identification of bacterial process that could be causing sepsis was 1435.  CT resulted which shows evidence of pneumonia.  Original chest x-ray was read as negative.  At this time patient is initiated on antibiotics.   Patient was given doxycycline and azithromycin.  Patient was then discussed with IMS for admission.        Procedure  Electrocardiogram, 12-lead ACS symptoms    Performed by: Jess Roberts MD  Authorized by: Jess Roberts MD    Interpretation:     Details:  EKG was obtained at 7:49 AM.  It is sinus rhythm rate of 106.  No acute ST elevation.  Mild artifact limitation..  Was 171 and QTc is 465.       Jess Roberts MD  10/05/24 1436       Jess Roberts MD  10/05/24 6837

## 2024-10-05 NOTE — H&P
[facility] - GENERAL MEDICINE HISTORY AND PHYSICAL    History Obtained From: Patient  Collateral History: Chart review    History Of Present Illness:  Jody Perales is a 84 y.o. female with PMHx s/f HTN, HLD, T2DM, hypothyroidism, dementia presenting after a fall at the nursing facility. It was an unwitnessed fall and she denies hitting her head, LOC, or being on blood thinners. She states to be feeling weak for the past few days, reports to have lost her balance. She uses a walker at baseline. She denies headache, dizziness, f/c/n/v, chest pain, sob, abd pain, changes in urinary or bowel symptoms, syncope.     ED Course (Summary):   Vitals on presentation: 100.4 F, 112 bpm, 18, 128/84, 96% on RA  Labs: CMP-glucose 127, sodium 134, creatinine 1.06  Lactate 2.4-1.9  CBC-neutrophils 7.89  UA negative  Imaging: CXR-no acute process  CT chest AP-patchy airspace disease with early lung consolidation seen adjacent to the left side of the aortic arch and within the left lower lung posteriorly concerning for multifocal pneumonia.  Cholelithiasis.  Multiple left parapelvic cysts.  Significant diverticular disease of sigmoid colon.  Interventions: Tylenol 650 mg, NS 1 L, started on Zithromax and doxycycline, admission for further management    ED Course (From ED Provider):  Diagnoses as of 10/05/24 1451   Pneumonia, unspecified organism     Relevant Results  Results for orders placed or performed during the hospital encounter of 10/05/24 (from the past 24 hour(s))   CBC and Auto Differential   Result Value Ref Range    WBC 9.3 4.4 - 11.3 x10*3/uL    nRBC 0.0 0.0 - 0.0 /100 WBCs    RBC 4.03 4.00 - 5.20 x10*6/uL    Hemoglobin 13.2 12.0 - 16.0 g/dL    Hematocrit 39.3 36.0 - 46.0 %    MCV 98 80 - 100 fL    MCH 32.8 26.0 - 34.0 pg    MCHC 33.6 32.0 - 36.0 g/dL    RDW 13.2 11.5 - 14.5 %    Platelets 166 150 - 450 x10*3/uL    Neutrophils % 85.1 40.0 - 80.0 %    Immature Granulocytes %, Automated 0.4 0.0 - 0.9 %     Lymphocytes % 5.6 13.0 - 44.0 %    Monocytes % 8.5 2.0 - 10.0 %    Eosinophils % 0.3 0.0 - 6.0 %    Basophils % 0.1 0.0 - 2.0 %    Neutrophils Absolute 7.89 (H) 1.60 - 5.50 x10*3/uL    Immature Granulocytes Absolute, Automated 0.04 0.00 - 0.50 x10*3/uL    Lymphocytes Absolute 0.52 (L) 0.80 - 3.00 x10*3/uL    Monocytes Absolute 0.79 0.05 - 0.80 x10*3/uL    Eosinophils Absolute 0.03 0.00 - 0.40 x10*3/uL    Basophils Absolute 0.01 0.00 - 0.10 x10*3/uL   Comprehensive Metabolic Panel   Result Value Ref Range    Glucose 127 (H) 74 - 99 mg/dL    Sodium 134 (L) 136 - 145 mmol/L    Potassium 4.7 3.5 - 5.3 mmol/L    Chloride 100 98 - 107 mmol/L    Bicarbonate 23 21 - 32 mmol/L    Anion Gap 16 10 - 20 mmol/L    Urea Nitrogen 16 6 - 23 mg/dL    Creatinine 1.06 (H) 0.50 - 1.05 mg/dL    eGFR 52 (L) >60 mL/min/1.73m*2    Calcium 9.1 8.6 - 10.3 mg/dL    Albumin 4.6 3.4 - 5.0 g/dL    Alkaline Phosphatase 60 33 - 136 U/L    Total Protein 8.0 6.4 - 8.2 g/dL    AST 60 (H) 9 - 39 U/L    Bilirubin, Total 0.6 0.0 - 1.2 mg/dL    ALT 23 7 - 45 U/L   Sars-CoV-2 PCR   Result Value Ref Range    Coronavirus 2019, PCR Not Detected Not Detected   Influenza A, and B PCR   Result Value Ref Range    Flu A Result Not Detected Not Detected    Flu B Result Not Detected Not Detected   Lactate   Result Value Ref Range    Lactate 2.4 (H) 0.4 - 2.0 mmol/L   Urinalysis with Reflex Culture and Microscopic   Result Value Ref Range    Color, Urine Yellow Light-Yellow, Yellow, Dark-Yellow    Appearance, Urine Clear Clear    Specific Gravity, Urine 1.023 1.005 - 1.035    pH, Urine 5.5 5.0, 5.5, 6.0, 6.5, 7.0, 7.5, 8.0    Protein, Urine 70 (1+) (A) NEGATIVE, 10 (TRACE), 20 (TRACE) mg/dL    Glucose, Urine Normal Normal mg/dL    Blood, Urine 1.0 (3+) (A) NEGATIVE    Ketones, Urine TRACE (A) NEGATIVE mg/dL    Bilirubin, Urine NEGATIVE NEGATIVE    Urobilinogen, Urine Normal Normal mg/dL    Nitrite, Urine NEGATIVE NEGATIVE    Leukocyte Esterase, Urine NEGATIVE  NEGATIVE   Urinalysis Microscopic   Result Value Ref Range    WBC, Urine 1-5 1-5, NONE /HPF    RBC, Urine 3-5 NONE, 1-2, 3-5 /HPF    Mucus, Urine FEW Reference range not established. /LPF   Lactate   Result Value Ref Range    Lactate 1.9 0.4 - 2.0 mmol/L      CT chest abdomen pelvis w IV contrast    Result Date: 10/5/2024  STUDY: CT Chest, Abdomen, and Pelvis with IV Contrast; 10/5/2024 at 12:15 PM. INDICATION: Fever. COMPARISON: XR chest 10/5/2024, 10/16/2020; XR left hip/pelvis 7/1/2021, 6/14/2021. ACCESSION NUMBER(S): [id number] ORDERING CLINICIAN: KEVIN MARTINEZ TECHNIQUE: CT of the chest, abdomen, and pelvis was performed.  Contiguous axial images were obtained at 3 mm slice thickness through the chest, abdomen, and pelvis.  Coronal and sagittal reconstructions at 3 mm slice thickness were performed.  Omnipaque 350 75 mL was administered intravenously.  FINDINGS: Both thyroid lobes demonstrate a hypoattenuating areas concerning for small thyroid cyst.  Series #2 slice 13 CHEST: MEDIASTINUM: The heart is normal in size without pericardial effusion.  Central vascular structures opacify normally.  There are aortopulmonary nodes measuring up to 0.9 cm.  There is a right paratracheal node with a transverse axis of 0.8 cm. LUNGS/PLEURA: There is no pleural effusion, pleural thickening, or pneumothorax. The airways are patent. There is patchy airspace disease with early lung consolidation seen adjacent to the left side of the aortic arch (series #3 slice 67) and within the left lower lung posteriorly (series #3 slice 170) concerning for multifocal pneumonia.  No suspicious nodules. LYMPH NODES: Thoracic lymph nodes are not enlarged. ABDOMEN:  LIVER: No hepatomegaly.  Smooth surface contour.  Normal attenuation.  There is a hypoattenuating lesion of 0.5 cm within the anterior right liver lobe probably compatible with a tiny cyst/hemangioma.  BILE DUCTS: No intrahepatic or extrahepatic biliary ductal dilatation.   GALLBLADDER: The gallbladder demonstrate multiple cholesterol stones within the gallbladder fundus.  No acute cholecystitis.  STOMACH: No abnormalities identified.  PANCREAS: No masses or ductal dilatation.  SPLEEN: No splenomegaly or focal splenic lesion.  ADRENAL GLANDS: No thickening or nodules.  KIDNEYS AND URETERS: Kidneys are normal in size and location.  No renal or ureteral calculi.  Multiple left parapelvic cysts, with the largest measuring up to 1.8 cm..  PELVIS:  BLADDER: No abnormalities identified.  REPRODUCTIVE ORGANS: No abnormalities identified.  BOWEL: Significant diverticular disease of the sigmoid colon without acute diverticulitis.  Series #2 slice 169.  No colonic mass, or acute mechanical bowel obstruction.  VESSELS: No abnormalities identified.  Abdominal aorta is normal in caliber.  PERITONEUM/RETROPERITONEUM/LYMPH NODES: No free fluid.  No pneumoperitoneum. No lymphadenopathy.  ABDOMINAL WALL: No abnormalities identified. SOFT TISSUES: No abnormalities identified.  BONES: No acute fracture or aggressive osseous lesion.  Hemangioma 2.5 x 2.2 cm demonstrated within the L2 vertebral body.  There is mild concavity involving the upper endplate of L1 compatible with a mild compression fracture; age indeterminate.  There is a small sclerotic bone lesion of 0.3 x 0.9 cm within the L1 vertebral body.  There is a left hip replacement in satisfactory anatomic alignment.  Moderate narrowing of the right hip joint.  No avascular necrosis of the femoral head.    1. There is patchy airspace disease with early lung consolidation seen adjacent to the left side of the aortic arch and within the left lower lung posteriorly concerning for multifocal pneumonia. 2. Cholelithiasis without evidence of acute cholecystitis. 3. Multiple left parapelvic cysts, with the largest measuring up to 1.8 cm. 4. Significant diverticular disease of the sigmoid colon without acute diverticulitis. 5. Left hip replacement in  satisfactory anatomic alignment. 6. Mild compression fracture of the upper endplate of L1; age indeterminate. 7. Small sclerotic bone lesion of 0.3 x 0.9 cm within the L1 vertebral body. Signed by Abhijeet Pacheco MD    XR chest 1 view    Result Date: 10/5/2024  Ltd.STUDY: Chest Radiograph;  10/5/2024 7:55 AM. INDICATION: Cough. COMPARISON: Chest and rib radiographs 3/27/2021. ACCESSION NUMBER(S): [id number] ORDERING CLINICIAN: KEVIN MARTINEZ TECHNIQUE:  Frontal chest was obtained at 07:55 hours. FINDINGS: CARDIOMEDIASTINAL SILHOUETTE: Cardiomediastinal silhouette is normal in size and configuration.  LUNGS: Lungs are clear.  Emphysematous changes noted.  ABDOMEN: No remarkable upper abdominal findings.  BONES: No acute osseous changes.    No acute process. Signed by Kristopher Garcia MD    Scheduled medications:  azithromycin, 500 mg, intravenous, Once  [START ON 10/6/2024] azithromycin, 500 mg, intravenous, q24h  doxycycline, 100 mg, intravenous, Once  [START ON 10/6/2024] doxycycline, 100 mg, intravenous, q12h  enoxaparin, 40 mg, subcutaneous, q24h  ipratropium-albuteroL, 3 mL, nebulization, q6h  memantine, 5 mg, oral, Daily  mirtazapine, 7.5 mg, oral, Nightly  [START ON 10/6/2024] pantoprazole, 40 mg, oral, Daily before breakfast   Or  [START ON 10/6/2024] pantoprazole, 40 mg, intravenous, Daily before breakfast      Continuous medications:     PRN medications:  PRN medications: acetaminophen, guaiFENesin, melatonin, ondansetron, polyethylene glycol     Past Medical History  She has no past medical history of Diabetes mellitus (Multi).    Surgical History  She has a past surgical history that includes Other surgical history (07/08/2020) and Other surgical history (07/08/2020).     Social History  She has no history on file for tobacco use, alcohol use, and drug use.    Family History  No family history on file.    Allergies  Cipro [ciprofloxacin hcl], Keflex [cephalexin], Levaquin [levofloxacin], and  Neosporin af [miconazole nitrate]    Code Status  Full Code     Review of Systems   Constitutional:  Positive for fever. Negative for chills and fatigue.   Respiratory:  Negative for cough, chest tightness, shortness of breath and wheezing.    Cardiovascular:  Negative for chest pain, palpitations and leg swelling.   Gastrointestinal:  Negative for abdominal pain, constipation, diarrhea, nausea and vomiting.   Genitourinary:  Negative for flank pain and hematuria.   Musculoskeletal:  Negative for back pain and joint swelling.   Skin:  Negative for rash and wound.   Neurological:  Positive for weakness. Negative for tremors, syncope and headaches.        Generalized weakness       Last Recorded Vitals  /73 (BP Location: Left arm, Patient Position: Lying)   Pulse 93   Temp 37.4 °C (99.4 °F) (Oral)   Resp 18   Wt 59 kg (130 lb)   SpO2 95%      Physical Exam:  Vital signs and nursing notes reviewed.   Constitutional: Pleasant and cooperative. Laying in bed in no acute distress. Conversant.   Skin: Warm and dry; no obvious lesions, rashes, pallor, or jaundice. Good turgor.   Eyes: EOMI. Anicteric sclera.   ENT: Mucous membranes moist; no obvious injury or deformity appreciated.   Head and Neck: Normocephalic, atraumatic. ROM preserved.   Respiratory: Nonlabored on RA. Lungs clear to auscultation bilaterally without obvious adventitious sounds. Chest rise is equal.  Cardiovascular: RRR. No gross murmur, gallop, or rub. Extremities are warm and well-perfused with good capillary refill (< 3 seconds). No chest wall tenderness.   GI: Abdomen soft, nontender, nondistended. No obvious organomegaly appreciated. Bowel sounds are present and normoactive.  : No CVA tenderness.   MSK: No gross abnormalities appreciated. No limitations to AROM/PROM appreciated.   Extremities: No cyanosis, edema, or clubbing evident. Neurovascularly intact.   Neuro: A&Ox2. Knows her name, where she is at, does not know the year. She knows  what season it is. Able to respond to questions appropriately and clearly. No acute focal neurologic deficits appreciated.  Psych: Flat affect.     Assessment/Plan     84 y.o. female with PMHx s/f HTN, HLD, T2DM, hypothyroidism, dementia presenting after a fall at the nursing facility.     Multifocal Pneumonia  -CT chest AP - patchy airspace disease with early lung consolidation seen adjacent to the left side of the aortic arch and within the left lower lung posteriorly concerning for multifocal pneumonia  -Continue antibiotic coverage with doxycycline, zithromax  -Strep, legionella urine antigen  -Sputum culture  -DuoNebs  -Pulmonary hygiene   -RT c/s appreciated  -Follow fever curve, WBCs    Sepsis without shock with acute end-organ dysfunction of lactic acidosis  -SIRS: fever, pulse  -suspect secondary to pneumonia  -lactate normalized after NS 1L from 2.4 >> 1.9  -normotensive BP  -Blood cultures x2  -Continue antibiotic coverage with doxycycline, zithromax  -Follow fever curve, WBCs    HLD  -continue statin    Dementia  -continue home memantine    T2DM  -A1C pending  -not on oral meds  -consider starting SSI pending A1C results    Med reconciliation is not completed, adjust doses and meds as needed    Diet: regular  DVT Prophylaxis: lovenox subcutaneous   Code Status: full code     Raegan Obando PA-C    Dragon dictation software was used to dictate this note and thus there may be minor errors in translation/transcription including garbled speech or misspellings. Please contact for clarification if needed.

## 2024-10-06 VITALS
OXYGEN SATURATION: 93 % | HEIGHT: 65 IN | HEART RATE: 90 BPM | SYSTOLIC BLOOD PRESSURE: 134 MMHG | BODY MASS INDEX: 21.66 KG/M2 | RESPIRATION RATE: 17 BRPM | TEMPERATURE: 98.2 F | WEIGHT: 130 LBS | DIASTOLIC BLOOD PRESSURE: 70 MMHG

## 2024-10-06 LAB
ALBUMIN SERPL BCP-MCNC: 3.7 G/DL (ref 3.4–5)
ALP SERPL-CCNC: 47 U/L (ref 33–136)
ALT SERPL W P-5'-P-CCNC: 25 U/L (ref 7–45)
ANION GAP SERPL CALC-SCNC: 12 MMOL/L (ref 10–20)
AST SERPL W P-5'-P-CCNC: 80 U/L (ref 9–39)
BACTERIA BLD CULT: NORMAL
BACTERIA BLD CULT: NORMAL
BASOPHILS # BLD AUTO: 0.02 X10*3/UL (ref 0–0.1)
BASOPHILS NFR BLD AUTO: 0.3 %
BILIRUB SERPL-MCNC: 0.6 MG/DL (ref 0–1.2)
BUN SERPL-MCNC: 15 MG/DL (ref 6–23)
CALCIUM SERPL-MCNC: 8.3 MG/DL (ref 8.6–10.3)
CHLORIDE SERPL-SCNC: 106 MMOL/L (ref 98–107)
CO2 SERPL-SCNC: 23 MMOL/L (ref 21–32)
CREAT SERPL-MCNC: 0.93 MG/DL (ref 0.5–1.05)
EGFRCR SERPLBLD CKD-EPI 2021: 61 ML/MIN/1.73M*2
EOSINOPHIL # BLD AUTO: 0 X10*3/UL (ref 0–0.4)
EOSINOPHIL NFR BLD AUTO: 0 %
ERYTHROCYTE [DISTWIDTH] IN BLOOD BY AUTOMATED COUNT: 13.6 % (ref 11.5–14.5)
GLUCOSE BLD MANUAL STRIP-MCNC: 114 MG/DL (ref 74–99)
GLUCOSE SERPL-MCNC: 96 MG/DL (ref 74–99)
HCT VFR BLD AUTO: 33.4 % (ref 36–46)
HGB BLD-MCNC: 11.2 G/DL (ref 12–16)
IMM GRANULOCYTES # BLD AUTO: 0.02 X10*3/UL (ref 0–0.5)
IMM GRANULOCYTES NFR BLD AUTO: 0.3 % (ref 0–0.9)
LEGIONELLA AG UR QL: NEGATIVE
LYMPHOCYTES # BLD AUTO: 1.1 X10*3/UL (ref 0.8–3)
LYMPHOCYTES NFR BLD AUTO: 15.2 %
MCH RBC QN AUTO: 32.4 PG (ref 26–34)
MCHC RBC AUTO-ENTMCNC: 33.5 G/DL (ref 32–36)
MCV RBC AUTO: 97 FL (ref 80–100)
MONOCYTES # BLD AUTO: 0.88 X10*3/UL (ref 0.05–0.8)
MONOCYTES NFR BLD AUTO: 12.2 %
NEUTROPHILS # BLD AUTO: 5.2 X10*3/UL (ref 1.6–5.5)
NEUTROPHILS NFR BLD AUTO: 72 %
NRBC BLD-RTO: 0 /100 WBCS (ref 0–0)
PLATELET # BLD AUTO: 105 X10*3/UL (ref 150–450)
POTASSIUM SERPL-SCNC: 4.1 MMOL/L (ref 3.5–5.3)
PROT SERPL-MCNC: 6.7 G/DL (ref 6.4–8.2)
RBC # BLD AUTO: 3.46 X10*6/UL (ref 4–5.2)
S PNEUM AG UR QL: NEGATIVE
SODIUM SERPL-SCNC: 137 MMOL/L (ref 136–145)
WBC # BLD AUTO: 7.2 X10*3/UL (ref 4.4–11.3)

## 2024-10-06 PROCEDURE — 1200000002 HC GENERAL ROOM WITH TELEMETRY DAILY

## 2024-10-06 PROCEDURE — 85025 COMPLETE CBC W/AUTO DIFF WBC: CPT

## 2024-10-06 PROCEDURE — 2500000001 HC RX 250 WO HCPCS SELF ADMINISTERED DRUGS (ALT 637 FOR MEDICARE OP)

## 2024-10-06 PROCEDURE — 80053 COMPREHEN METABOLIC PANEL: CPT

## 2024-10-06 PROCEDURE — 36415 COLL VENOUS BLD VENIPUNCTURE: CPT

## 2024-10-06 PROCEDURE — 82947 ASSAY GLUCOSE BLOOD QUANT: CPT

## 2024-10-06 PROCEDURE — 99233 SBSQ HOSP IP/OBS HIGH 50: CPT | Performed by: FAMILY MEDICINE

## 2024-10-06 PROCEDURE — 2500000004 HC RX 250 GENERAL PHARMACY W/ HCPCS (ALT 636 FOR OP/ED)

## 2024-10-06 RX ADMIN — DOXYCYCLINE 100 MG: 100 INJECTION, POWDER, LYOPHILIZED, FOR SOLUTION INTRAVENOUS at 20:33

## 2024-10-06 RX ADMIN — MEMANTINE 5 MG: 5 TABLET ORAL at 08:11

## 2024-10-06 RX ADMIN — ACETAMINOPHEN 650 MG: 325 TABLET ORAL at 05:23

## 2024-10-06 RX ADMIN — ENOXAPARIN SODIUM 40 MG: 40 INJECTION SUBCUTANEOUS at 15:01

## 2024-10-06 RX ADMIN — DOXYCYCLINE 100 MG: 100 INJECTION, POWDER, LYOPHILIZED, FOR SOLUTION INTRAVENOUS at 08:11

## 2024-10-06 RX ADMIN — MIRTAZAPINE 7.5 MG: 15 TABLET, FILM COATED ORAL at 20:33

## 2024-10-06 RX ADMIN — AZITHROMYCIN MONOHYDRATE 500 MG: 500 INJECTION, POWDER, LYOPHILIZED, FOR SOLUTION INTRAVENOUS at 15:01

## 2024-10-06 RX ADMIN — PANTOPRAZOLE SODIUM 40 MG: 40 TABLET, DELAYED RELEASE ORAL at 06:27

## 2024-10-06 RX ADMIN — ATORVASTATIN CALCIUM 10 MG: 10 TABLET, FILM COATED ORAL at 20:33

## 2024-10-06 ASSESSMENT — COGNITIVE AND FUNCTIONAL STATUS - GENERAL
DRESSING REGULAR LOWER BODY CLOTHING: A LITTLE
TOILETING: A LITTLE
DAILY ACTIVITIY SCORE: 18
WALKING IN HOSPITAL ROOM: A LOT
MOVING FROM LYING ON BACK TO SITTING ON SIDE OF FLAT BED WITH BEDRAILS: A LOT
MOBILITY SCORE: 12
WALKING IN HOSPITAL ROOM: A LOT
TURNING FROM BACK TO SIDE WHILE IN FLAT BAD: A LOT
DRESSING REGULAR UPPER BODY CLOTHING: A LOT
TOILETING: A LITTLE
HELP NEEDED FOR BATHING: A LITTLE
PERSONAL GROOMING: A LITTLE
MOVING FROM LYING ON BACK TO SITTING ON SIDE OF FLAT BED WITH BEDRAILS: A LOT
DRESSING REGULAR UPPER BODY CLOTHING: A LITTLE
HELP NEEDED FOR BATHING: A LITTLE
PERSONAL GROOMING: A LITTLE
CLIMB 3 TO 5 STEPS WITH RAILING: A LOT
MOBILITY SCORE: 12
MOVING TO AND FROM BED TO CHAIR: A LOT
STANDING UP FROM CHAIR USING ARMS: A LOT
MOVING TO AND FROM BED TO CHAIR: A LOT
STANDING UP FROM CHAIR USING ARMS: A LOT
DAILY ACTIVITIY SCORE: 19
TURNING FROM BACK TO SIDE WHILE IN FLAT BAD: A LOT
DRESSING REGULAR LOWER BODY CLOTHING: A LITTLE
CLIMB 3 TO 5 STEPS WITH RAILING: A LOT

## 2024-10-06 ASSESSMENT — PAIN SCALES - GENERAL
PAINLEVEL_OUTOF10: 0 - NO PAIN
PAINLEVEL_OUTOF10: 0 - NO PAIN

## 2024-10-06 ASSESSMENT — PAIN - FUNCTIONAL ASSESSMENT: PAIN_FUNCTIONAL_ASSESSMENT: 0-10

## 2024-10-06 NOTE — CARE PLAN
Problem: Skin  Goal: Participates in plan/prevention/treatment measures  Outcome: Progressing  Goal: Prevent/manage excess moisture  Outcome: Progressing  Goal: Prevent/minimize sheer/friction injuries  Outcome: Progressing  Goal: Promote/optimize nutrition  Outcome: Progressing  Flowsheets (Taken 10/6/2024 1931)  Promote/optimize nutrition: Monitor/record intake including meals     Problem: Pain - Adult  Goal: Verbalizes/displays adequate comfort level or baseline comfort level  Outcome: Progressing     Problem: Safety - Adult  Goal: Free from fall injury  Outcome: Progressing     Problem: Discharge Planning  Goal: Discharge to home or other facility with appropriate resources  Outcome: Progressing     Problem: Chronic Conditions and Co-morbidities  Goal: Patient's chronic conditions and co-morbidity symptoms are monitored and maintained or improved  Outcome: Progressing     Problem: Respiratory  Goal: Clear secretions with interventions this shift  Outcome: Progressing  Goal: Minimize anxiety/maximize coping throughout shift  Outcome: Progressing  Goal: Minimal/no exertional discomfort or dyspnea this shift  Outcome: Progressing  Goal: No signs of respiratory distress (eg. Use of accessory muscles. Peds grunting)  Outcome: Progressing  Goal: Patent airway maintained this shift  Outcome: Progressing  Goal: Verbalize decreased shortness of breath this shift  Outcome: Progressing  Goal: Increase self care and/or family involvement in next 24 hours  Outcome: Progressing     Problem: Pain  Goal: Takes deep breaths with improved pain control throughout the shift  Outcome: Progressing  Goal: Turns in bed with improved pain control throughout the shift  Outcome: Progressing  Goal: Walks with improved pain control throughout the shift  Outcome: Progressing  Goal: Performs ADL's with improved pain control throughout shift  Outcome: Progressing  Goal: Participates in PT with improved pain control throughout the  shift  Outcome: Progressing  Goal: Free from acute confusion related to pain meds throughout the shift  Outcome: Progressing     Problem: Fall/Injury  Goal: Not fall by end of shift  Outcome: Progressing  Goal: Be free from injury by end of the shift  Outcome: Progressing  Goal: Verbalize understanding of personal risk factors for fall in the hospital  Outcome: Progressing  Goal: Verbalize understanding of risk factor reduction measures to prevent injury from fall in the home  Outcome: Progressing  Goal: Use assistive devices by end of the shift  Outcome: Progressing  Goal: Pace activities to prevent fatigue by end of the shift  Outcome: Progressing

## 2024-10-06 NOTE — PROGRESS NOTES
Jody Perales is a 84 y.o. female on day 1 of admission presenting with Pneumonia, unspecified organism.      Subjective   84 y.o. female with PMHx s/f HTN, HLD, T2DM, hypothyroidism, dementia presenting after a fall at the nursing facility. It was an unwitnessed fall and she denies hitting her head, LOC, or being on blood thinners. She states to be feeling weak for the past few days, reports to have lost her balance. She uses a walker at baseline. She denies headache, dizziness, f/c/n/v, chest pain, sob, abd pain, changes in urinary or bowel symptoms, syncope.      ED Course (Summary):   Vitals on presentation: 100.4 F, 112 bpm, 18, 128/84, 96% on RA  Labs: CMP-glucose 127, sodium 134, creatinine 1.06  Lactate 2.4-1.9  CBC-neutrophils 7.89  UA negative  Imaging: CXR-no acute process  CT chest AP-patchy airspace disease with early lung consolidation seen adjacent to the left side of the aortic arch and within the left lower lung posteriorly concerning for multifocal pneumonia.  Cholelithiasis.  Multiple left parapelvic cysts.  Significant diverticular disease of sigmoid colon.  Interventions: Tylenol 650 mg, NS 1 L, started on Zithromax and doxycycline        10/6:                 Today the patient is awake alert and interactive appropriately.  Describes overall feeling better but still feeling very weak.  Poor appetite.  Last recorded temp of 100.7 around 5:30 AM today.  Continues to do well on room air.  Lactic acid resolved.  Blood cultures remain pending.      Review of Systems   Constitutional: Negative for fever/chills.  Positive for fatigue.   Respiratory:  Negative for cough, chest tightness, shortness of breath and wheezing.    Cardiovascular:  Negative for chest pain, palpitations and leg swelling.   Gastrointestinal:  Negative for abdominal pain, constipation, diarrhea, nausea and vomiting.   Genitourinary:  Negative for flank pain and hematuria.   Musculoskeletal:  Negative for back pain and joint  swelling.   Skin:  Negative for rash and wound.   Neurological:  Positive for weakness. Negative for tremors, syncope and headaches.        Generalized weakness          Objective     Last Recorded Vitals  /73 (BP Location: Left arm, Patient Position: Lying)   Pulse 69   Temp 37 °C (98.6 °F) (Temporal)   Resp 18   Wt 59 kg (130 lb)   SpO2 95%   Intake/Output last 3 Shifts:    Intake/Output Summary (Last 24 hours) at 10/6/2024 1707  Last data filed at 10/6/2024 1323  Gross per 24 hour   Intake 418 ml   Output --   Net 418 ml       Admission Weight  Weight: 59 kg (130 lb) (10/05/24 0722)    Daily Weight  10/05/24 : 59 kg (130 lb)    Image Results  CT chest abdomen pelvis w IV contrast  Narrative: STUDY:  CT Chest, Abdomen, and Pelvis with IV Contrast; 10/5/2024 at 12:15 PM.  INDICATION:  Fever.  COMPARISON:  XR chest 10/5/2024, 10/16/2020; XR left hip/pelvis 7/1/2021,  6/14/2021.  ACCESSION NUMBER(S):  [id number]  ORDERING CLINICIAN:  KEVIN MARTINEZ  TECHNIQUE:  CT of the chest, abdomen, and pelvis was performed.  Contiguous axial  images were obtained at 3 mm slice thickness through the chest,  abdomen, and pelvis.  Coronal and sagittal reconstructions at 3 mm  slice thickness were performed.  Omnipaque 350 75 mL was administered  intravenously.    FINDINGS:  Both thyroid lobes demonstrate a hypoattenuating areas concerning for  small thyroid cyst.  Series #2 slice 13  CHEST:  MEDIASTINUM:  The heart is normal in size without pericardial effusion.  Central  vascular structures opacify normally.  There are aortopulmonary nodes  measuring up to 0.9 cm.  There is a right paratracheal node with a  transverse axis of 0.8 cm.  LUNGS/PLEURA:  There is no pleural effusion, pleural thickening, or pneumothorax.   The airways are patent.  There is patchy airspace disease with early lung consolidation seen  adjacent to the left side of the aortic arch (series #3 slice 67) and  within the left lower lung  posteriorly (series #3 slice 170)  concerning for multifocal pneumonia.  No suspicious nodules.  LYMPH NODES:  Thoracic lymph nodes are not enlarged.  ABDOMEN:     LIVER:  No hepatomegaly.  Smooth surface contour.  Normal attenuation.  There  is a hypoattenuating lesion of 0.5 cm within the anterior right liver  lobe probably compatible with a tiny cyst/hemangioma.     BILE DUCTS:  No intrahepatic or extrahepatic biliary ductal dilatation.     GALLBLADDER:  The gallbladder demonstrate multiple cholesterol stones within the  gallbladder fundus.  No acute cholecystitis.    STOMACH:  No abnormalities identified.     PANCREAS:  No masses or ductal dilatation.     SPLEEN:  No splenomegaly or focal splenic lesion.     ADRENAL GLANDS:  No thickening or nodules.     KIDNEYS AND URETERS:  Kidneys are normal in size and location.  No renal or ureteral  calculi.  Multiple left parapelvic cysts, with the largest measuring  up to 1.8 cm..     PELVIS:     BLADDER:  No abnormalities identified.     REPRODUCTIVE ORGANS:  No abnormalities identified.     BOWEL:  Significant diverticular disease of the sigmoid colon without acute  diverticulitis.  Series #2 slice 169.  No colonic mass, or acute  mechanical bowel obstruction.     VESSELS:  No abnormalities identified.  Abdominal aorta is normal in caliber.      PERITONEUM/RETROPERITONEUM/LYMPH NODES:  No free fluid.  No pneumoperitoneum.  No lymphadenopathy.     ABDOMINAL WALL:  No abnormalities identified.  SOFT TISSUES:   No abnormalities identified.     BONES:  No acute fracture or aggressive osseous lesion.  Hemangioma 2.5 x 2.2  cm demonstrated within the L2 vertebral body.  There is mild concavity  involving the upper endplate of L1 compatible with a mild compression  fracture; age indeterminate.  There is a small sclerotic bone lesion  of 0.3 x 0.9 cm within the L1 vertebral body.  There is a left hip  replacement in satisfactory anatomic alignment.  Moderate narrowing of  the  right hip joint.  No avascular necrosis of the femoral head.  Impression: 1. There is patchy airspace disease with early lung consolidation seen  adjacent to the left side of the aortic arch and within the left lower  lung posteriorly concerning for multifocal pneumonia.  2. Cholelithiasis without evidence of acute cholecystitis.  3. Multiple left parapelvic cysts, with the largest measuring up to  1.8 cm.  4. Significant diverticular disease of the sigmoid colon without acute  diverticulitis.  5. Left hip replacement in satisfactory anatomic alignment.  6. Mild compression fracture of the upper endplate of L1; age  indeterminate.  7. Small sclerotic bone lesion of 0.3 x 0.9 cm within the L1 vertebral  body.   Signed by Abhijeet Pacheco MD  XR chest 1 view  Narrative: Ltd.STUDY:  Chest Radiograph;  10/5/2024 7:55 AM.  INDICATION:  Cough.  COMPARISON:  Chest and rib radiographs 3/27/2021.  ACCESSION NUMBER(S):  [id number]  ORDERING CLINICIAN:  KEVIN MARTINEZ  TECHNIQUE:  Frontal chest was obtained at 07:55 hours.  FINDINGS:  CARDIOMEDIASTINAL SILHOUETTE:  Cardiomediastinal silhouette is normal in size and configuration.     LUNGS:  Lungs are clear.  Emphysematous changes noted.     ABDOMEN:  No remarkable upper abdominal findings.     BONES:  No acute osseous changes.  Impression: No acute process.  Signed by Kristopher Garcia MD      Physical Exam  Constitutional: Pleasant and cooperative. Laying in bed in no acute distress. Conversant.   Skin: Warm and dry; no obvious lesions, rashes, pallor, or jaundice. Good turgor.   Eyes: EOMI. Anicteric sclera.   ENT: Mucous membranes moist; no obvious injury or deformity appreciated.   Head and Neck: Normocephalic, atraumatic. ROM preserved.   Respiratory: Nonlabored on RA. Lungs clear to auscultation bilaterally without obvious adventitious sounds. Chest rise is equal.  Cardiovascular: RRR. No gross murmur, gallop, or rub. Extremities are warm and well-perfused with good  capillary refill (< 3 seconds). No chest wall tenderness.   GI: Abdomen soft, nontender, nondistended. No obvious organomegaly appreciated. Bowel sounds are present and normoactive.  : No CVA tenderness.   MSK: No gross abnormalities appreciated. No limitations to AROM/PROM appreciated.   Extremities: No cyanosis, edema, or clubbing evident. Neurovascularly intact.   Neuro: A&Ox2. Knows her name, where she is at, does not know the year. She knows what season it is. Able to respond to questions appropriately and clearly. No acute focal neurologic deficits appreciated.  Psych: Flat affect.  Pleasant and cooperative to exam      Relevant Results               Assessment/Plan                  Assessment & Plan  Pneumonia, unspecified organism    Multifocal Pneumonia  -CT chest AP - patchy airspace disease with early lung consolidation seen adjacent to the left side of the aortic arch and within the left lower lung posteriorly concerning for multifocal pneumonia  -Continue antibiotic coverage with doxycycline, zithromax  -Strep, legionella urine antigen  -Sputum culture  -DuoNebs  -Pulmonary hygiene   -RT c/s appreciated  -Follow fever curve, WBCs  10/6: Blood cultures remain pending.  Urine antigens negative.  Continues on azithromycin and doxycycline.  Last temp recorded around 5:30 AM today.  Doing well on room air.     Sepsis without shock with acute end-organ dysfunction of lactic acidosis  -SIRS: fever, pulse  -suspect secondary to pneumonia  -lactate normalized after NS 1L from 2.4 >> 1.9  -normotensive BP  -Blood cultures x2 pending  -Continue antibiotic coverage with doxycycline, zithromax  -Follow fever curve, WBCs     HLD  -continue statin     Dementia  -continue home memantine     T2DM  -A1C pending  -not on oral meds  -consider starting SSI pending A1C results              Saeid Jerome MD

## 2024-10-06 NOTE — CARE PLAN
Problem: Skin  Goal: Participates in plan/prevention/treatment measures  Outcome: Progressing  Goal: Prevent/manage excess moisture  Outcome: Progressing  Goal: Prevent/minimize sheer/friction injuries  Outcome: Progressing  Goal: Promote/optimize nutrition  Outcome: Progressing  Flowsheets (Taken 10/5/2024 2252)  Promote/optimize nutrition: Monitor/record intake including meals     Problem: Pain - Adult  Goal: Verbalizes/displays adequate comfort level or baseline comfort level  Outcome: Progressing     Problem: Safety - Adult  Goal: Free from fall injury  Outcome: Progressing     Problem: Discharge Planning  Goal: Discharge to home or other facility with appropriate resources  Outcome: Progressing     Problem: Chronic Conditions and Co-morbidities  Goal: Patient's chronic conditions and co-morbidity symptoms are monitored and maintained or improved  Outcome: Progressing     Problem: Respiratory  Goal: Clear secretions with interventions this shift  Outcome: Progressing  Goal: Minimize anxiety/maximize coping throughout shift  Outcome: Progressing  Goal: Minimal/no exertional discomfort or dyspnea this shift  Outcome: Progressing  Goal: No signs of respiratory distress (eg. Use of accessory muscles. Peds grunting)  Outcome: Progressing  Goal: Patent airway maintained this shift  Outcome: Progressing  Goal: Verbalize decreased shortness of breath this shift  Outcome: Progressing  Goal: Increase self care and/or family involvement in next 24 hours  Outcome: Progressing     Problem: Pain  Goal: Takes deep breaths with improved pain control throughout the shift  Outcome: Progressing  Goal: Turns in bed with improved pain control throughout the shift  Outcome: Progressing  Goal: Walks with improved pain control throughout the shift  Outcome: Progressing  Goal: Performs ADL's with improved pain control throughout shift  Outcome: Progressing  Goal: Participates in PT with improved pain control throughout the  shift  Outcome: Progressing  Goal: Free from acute confusion related to pain meds throughout the shift  Outcome: Progressing     Problem: Fall/Injury  Goal: Not fall by end of shift  Outcome: Progressing  Goal: Be free from injury by end of the shift  Outcome: Progressing  Goal: Verbalize understanding of personal risk factors for fall in the hospital  Outcome: Progressing  Goal: Verbalize understanding of risk factor reduction measures to prevent injury from fall in the home  Outcome: Progressing  Goal: Use assistive devices by end of the shift  Outcome: Progressing  Goal: Pace activities to prevent fatigue by end of the shift  Outcome: Progressing

## 2024-10-06 NOTE — CARE PLAN
Problem: Skin  Goal: Participates in plan/prevention/treatment measures  Outcome: Progressing  Flowsheets (Taken 10/6/2024 1115)  Participates in plan/prevention/treatment measures: Discuss with provider PT/OT consult  Goal: Prevent/manage excess moisture  Outcome: Progressing  Flowsheets (Taken 10/6/2024 1115)  Prevent/manage excess moisture: Cleanse incontinence/protect with barrier cream  Goal: Prevent/minimize sheer/friction injuries  Outcome: Progressing  Goal: Promote/optimize nutrition  Outcome: Progressing     Problem: Pain - Adult  Goal: Verbalizes/displays adequate comfort level or baseline comfort level  Outcome: Progressing     Problem: Safety - Adult  Goal: Free from fall injury  Outcome: Progressing     Problem: Discharge Planning  Goal: Discharge to home or other facility with appropriate resources  Outcome: Progressing     Problem: Chronic Conditions and Co-morbidities  Goal: Patient's chronic conditions and co-morbidity symptoms are monitored and maintained or improved  Outcome: Progressing     Problem: Respiratory  Goal: Clear secretions with interventions this shift  Outcome: Progressing  Goal: Minimize anxiety/maximize coping throughout shift  Outcome: Progressing  Goal: Minimal/no exertional discomfort or dyspnea this shift  Outcome: Progressing  Goal: No signs of respiratory distress (eg. Use of accessory muscles. Peds grunting)  Outcome: Progressing  Goal: Patent airway maintained this shift  Outcome: Progressing  Goal: Verbalize decreased shortness of breath this shift  Outcome: Progressing  Goal: Increase self care and/or family involvement in next 24 hours  Outcome: Progressing     Problem: Pain  Goal: Takes deep breaths with improved pain control throughout the shift  Outcome: Progressing  Goal: Turns in bed with improved pain control throughout the shift  Outcome: Progressing  Goal: Walks with improved pain control throughout the shift  Outcome: Progressing  Goal: Performs ADL's with  improved pain control throughout shift  Outcome: Progressing  Goal: Participates in PT with improved pain control throughout the shift  Outcome: Progressing  Goal: Free from acute confusion related to pain meds throughout the shift  Outcome: Progressing     Problem: Fall/Injury  Goal: Not fall by end of shift  Outcome: Progressing  Goal: Be free from injury by end of the shift  Outcome: Progressing  Goal: Verbalize understanding of personal risk factors for fall in the hospital  Outcome: Progressing  Goal: Verbalize understanding of risk factor reduction measures to prevent injury from fall in the home  Outcome: Progressing  Goal: Use assistive devices by end of the shift  Outcome: Progressing  Goal: Pace activities to prevent fatigue by end of the shift  Outcome: Progressing   The patient's goals for the shift include      The clinical goals for the shift include Patient will remain free from falls and injuries throughout the shift.

## 2024-10-07 ENCOUNTER — PHARMACY VISIT (OUTPATIENT)
Dept: PHARMACY | Facility: CLINIC | Age: 84
End: 2024-10-07
Payer: COMMERCIAL

## 2024-10-07 VITALS
OXYGEN SATURATION: 94 % | DIASTOLIC BLOOD PRESSURE: 81 MMHG | HEART RATE: 98 BPM | TEMPERATURE: 98.6 F | SYSTOLIC BLOOD PRESSURE: 135 MMHG | RESPIRATION RATE: 16 BRPM | BODY MASS INDEX: 21.66 KG/M2 | HEIGHT: 65 IN | WEIGHT: 130 LBS

## 2024-10-07 PROBLEM — J18.9 PNEUMONIA, UNSPECIFIED ORGANISM: Status: RESOLVED | Noted: 2024-10-05 | Resolved: 2024-10-07

## 2024-10-07 LAB
ALBUMIN SERPL BCP-MCNC: 3.7 G/DL (ref 3.4–5)
ALP SERPL-CCNC: 49 U/L (ref 33–136)
ALT SERPL W P-5'-P-CCNC: 29 U/L (ref 7–45)
ANION GAP SERPL CALC-SCNC: 12 MMOL/L (ref 10–20)
AST SERPL W P-5'-P-CCNC: 84 U/L (ref 9–39)
ATRIAL RATE: 103 BPM
BILIRUB SERPL-MCNC: 0.5 MG/DL (ref 0–1.2)
BUN SERPL-MCNC: 16 MG/DL (ref 6–23)
CALCIUM SERPL-MCNC: 8.4 MG/DL (ref 8.6–10.3)
CHLORIDE SERPL-SCNC: 106 MMOL/L (ref 98–107)
CO2 SERPL-SCNC: 24 MMOL/L (ref 21–32)
CREAT SERPL-MCNC: 0.79 MG/DL (ref 0.5–1.05)
EGFRCR SERPLBLD CKD-EPI 2021: 74 ML/MIN/1.73M*2
ERYTHROCYTE [DISTWIDTH] IN BLOOD BY AUTOMATED COUNT: 13.1 % (ref 11.5–14.5)
EST. AVERAGE GLUCOSE BLD GHB EST-MCNC: 126 MG/DL
GLUCOSE SERPL-MCNC: 96 MG/DL (ref 74–99)
HBA1C MFR BLD: 6 %
HCT VFR BLD AUTO: 35 % (ref 36–46)
HGB BLD-MCNC: 11.6 G/DL (ref 12–16)
MCH RBC QN AUTO: 32.1 PG (ref 26–34)
MCHC RBC AUTO-ENTMCNC: 33.1 G/DL (ref 32–36)
MCV RBC AUTO: 97 FL (ref 80–100)
NRBC BLD-RTO: 0 /100 WBCS (ref 0–0)
P AXIS: 73 DEGREES
PLATELET # BLD AUTO: 148 X10*3/UL (ref 150–450)
POTASSIUM SERPL-SCNC: 3.8 MMOL/L (ref 3.5–5.3)
PR INTERVAL: 171 MS
PROT SERPL-MCNC: 6.6 G/DL (ref 6.4–8.2)
Q ONSET: 249 MS
QRS COUNT: 17 BEATS
QRS DURATION: 89 MS
QT INTERVAL: 350 MS
QTC CALCULATION(BAZETT): 465 MS
QTC FREDERICIA: 422 MS
R AXIS: 34 DEGREES
RBC # BLD AUTO: 3.61 X10*6/UL (ref 4–5.2)
SODIUM SERPL-SCNC: 138 MMOL/L (ref 136–145)
T AXIS: 16 DEGREES
T OFFSET: 424 MS
VENTRICULAR RATE: 106 BPM
WBC # BLD AUTO: 6.9 X10*3/UL (ref 4.4–11.3)

## 2024-10-07 PROCEDURE — 99239 HOSP IP/OBS DSCHRG MGMT >30: CPT | Performed by: FAMILY MEDICINE

## 2024-10-07 PROCEDURE — 2500000001 HC RX 250 WO HCPCS SELF ADMINISTERED DRUGS (ALT 637 FOR MEDICARE OP)

## 2024-10-07 PROCEDURE — 97161 PT EVAL LOW COMPLEX 20 MIN: CPT | Mod: GP

## 2024-10-07 PROCEDURE — 2500000001 HC RX 250 WO HCPCS SELF ADMINISTERED DRUGS (ALT 637 FOR MEDICARE OP): Performed by: FAMILY MEDICINE

## 2024-10-07 PROCEDURE — RXMED WILLOW AMBULATORY MEDICATION CHARGE

## 2024-10-07 PROCEDURE — 84075 ASSAY ALKALINE PHOSPHATASE: CPT | Performed by: FAMILY MEDICINE

## 2024-10-07 PROCEDURE — 97165 OT EVAL LOW COMPLEX 30 MIN: CPT | Mod: GO

## 2024-10-07 PROCEDURE — 36415 COLL VENOUS BLD VENIPUNCTURE: CPT | Performed by: FAMILY MEDICINE

## 2024-10-07 PROCEDURE — 2500000004 HC RX 250 GENERAL PHARMACY W/ HCPCS (ALT 636 FOR OP/ED)

## 2024-10-07 PROCEDURE — 85027 COMPLETE CBC AUTOMATED: CPT | Performed by: FAMILY MEDICINE

## 2024-10-07 RX ORDER — LORAZEPAM 0.5 MG/1
0.5 TABLET ORAL ONCE
Status: COMPLETED | OUTPATIENT
Start: 2024-10-07 | End: 2024-10-07

## 2024-10-07 RX ORDER — GUAIFENESIN 600 MG/1
600 TABLET, EXTENDED RELEASE ORAL EVERY 12 HOURS PRN
Qty: 20 TABLET | Refills: 0 | Status: SHIPPED | OUTPATIENT
Start: 2024-10-07 | End: 2024-10-17

## 2024-10-07 RX ORDER — AZITHROMYCIN 500 MG/1
500 TABLET, FILM COATED ORAL DAILY
Qty: 4 TABLET | Refills: 0 | Status: SHIPPED | OUTPATIENT
Start: 2024-10-07 | End: 2024-10-11

## 2024-10-07 RX ORDER — DOXYCYCLINE 100 MG/1
100 CAPSULE ORAL 2 TIMES DAILY
Qty: 10 CAPSULE | Refills: 0 | Status: SHIPPED | OUTPATIENT
Start: 2024-10-07 | End: 2024-10-12

## 2024-10-07 RX ADMIN — PANTOPRAZOLE SODIUM 40 MG: 40 TABLET, DELAYED RELEASE ORAL at 05:29

## 2024-10-07 RX ADMIN — MEMANTINE 5 MG: 5 TABLET ORAL at 09:09

## 2024-10-07 RX ADMIN — LORAZEPAM 0.5 MG: 0.5 TABLET ORAL at 12:00

## 2024-10-07 RX ADMIN — DOXYCYCLINE 100 MG: 100 INJECTION, POWDER, LYOPHILIZED, FOR SOLUTION INTRAVENOUS at 09:09

## 2024-10-07 ASSESSMENT — ACTIVITIES OF DAILY LIVING (ADL)
ADL_ASSISTANCE: INDEPENDENT
ADL_ASSISTANCE: INDEPENDENT
BATHING_ASSISTANCE: MINIMAL
ADL_ASSISTANCE: INDEPENDENT
BATHING_ASSISTANCE: MINIMAL

## 2024-10-07 ASSESSMENT — COGNITIVE AND FUNCTIONAL STATUS - GENERAL
DRESSING REGULAR UPPER BODY CLOTHING: A LITTLE
HELP NEEDED FOR BATHING: A LITTLE
MOBILITY SCORE: 19
DRESSING REGULAR LOWER BODY CLOTHING: A LITTLE
WALKING IN HOSPITAL ROOM: A LITTLE
TOILETING: A LITTLE
STANDING UP FROM CHAIR USING ARMS: A LITTLE
MOVING TO AND FROM BED TO CHAIR: A LITTLE
DRESSING REGULAR LOWER BODY CLOTHING: A LITTLE
DRESSING REGULAR UPPER BODY CLOTHING: A LITTLE
DAILY ACTIVITIY SCORE: 20
HELP NEEDED FOR BATHING: A LITTLE
TOILETING: A LITTLE
STANDING UP FROM CHAIR USING ARMS: A LITTLE
PERSONAL GROOMING: A LITTLE
HELP NEEDED FOR BATHING: A LITTLE
DRESSING REGULAR UPPER BODY CLOTHING: A LITTLE
TOILETING: A LITTLE
PERSONAL GROOMING: A LITTLE
MOBILITY SCORE: 22
DAILY ACTIVITIY SCORE: 19
CLIMB 3 TO 5 STEPS WITH RAILING: TOTAL
DRESSING REGULAR LOWER BODY CLOTHING: A LITTLE

## 2024-10-07 ASSESSMENT — PAIN - FUNCTIONAL ASSESSMENT
PAIN_FUNCTIONAL_ASSESSMENT: 0-10
PAIN_FUNCTIONAL_ASSESSMENT: 0-10

## 2024-10-07 ASSESSMENT — PAIN SCALES - GENERAL
PAINLEVEL_OUTOF10: 0 - NO PAIN
PAINLEVEL_OUTOF10: 0 - NO PAIN

## 2024-10-07 NOTE — PROGRESS NOTES
Occupational Therapy    Evaluation    Patient Name: Jody Perales  MRN: [id number]  Department: [facility] 2 E  Room: [address] [place] [address]  Today's Date: 10/7/2024  Time Calculation  Start Time: 1257  Stop Time: 1305  Time Calculation (min): 8 min        Assessment:  OT Assessment: OT eval completed. Pt presents slightly below functional baseline. Pt current level of assist CGA to MIN A for mobility and ADLs. Pt would benefit from further OT to ensure safe return to PLOF  Prognosis: Good  Barriers to Discharge: None  Evaluation/Treatment Tolerance: Patient tolerated treatment well  End of Session Communication: PCT/NA/CTA  End of Session Patient Position: Up in chair, Alarm off, caregiver present  OT Assessment Results: Decreased ADL status, Decreased safe judgment during ADL, Decreased endurance, Decreased functional mobility  Prognosis: Good  Barriers to Discharge: None  Evaluation/Treatment Tolerance: Patient tolerated treatment well  Strengths: Support of Caregivers  Barriers to Participation: Insight into problems, Comorbidities  Plan:  Treatment Interventions: ADL retraining, Functional transfer training, UE strengthening/ROM, Endurance training, Patient/family training, Equipment evaluation/education, Neuromuscular reeducation  OT Frequency: 2 times per week  OT Discharge Recommendations: Low intensity level of continued care  Equipment Recommended upon Discharge:  (Rolling walker)  OT Recommended Transfer Status: Assist of 1  OT - OK to Discharge: Yes (ok to be dc to next level of care once medically cleared)  Treatment Interventions: ADL retraining, Functional transfer training, UE strengthening/ROM, Endurance training, Patient/family training, Equipment evaluation/education, Neuromuscular reeducation    Subjective   Current Problem:  1. Pneumonia, unspecified organism  azithromycin (Zithromax) 500 mg tablet    doxycycline (Vibramycin) 100 mg capsule    guaiFENesin (Mucinex) 600 mg 12 hr tablet         General:  General  Reason for Referral: Impaired ADLs and Functional Mobility -  presenting after a fall at the nursing facility. It was an unwitnessed fall and she denies hitting her head, LOC, or being on blood thinners. She states to be feeling weak for the past few days, reports to have lost her balance. She uses a walker at baseline. (Dx: Pneumonia, unspecified organism)  Referred By: Raegan Obando PA-C (chart reviewed + referral received)  Past Medical History Relevant to Rehab: PMHx: NIDDM, dementia, polio per patient report (though poor historian)  Family/Caregiver Present: No  Co-Treatment: PT  Co-Treatment Reason: Co-eval to maximize safety with mobility while focusing on OT specific goals  Prior to Session Communication: Bedside nurse (RN approved therapy)  Patient Position Received: Bed, 3 rail up, Alarm on (Semi supine, personal clothing on pants/ shirts, hospital socks)  General Comment: Pt seen in room 2305. Pt agreeable to therapy  Precautions:  Medical Precautions: Fall precautions              Pain:       Objective   Cognition:  Overall Cognitive Status: Impaired at baseline  Arousal/Alertness: Appropriate responses to stimuli  Orientation Level: Disoriented to time, Disoriented to situation  Safety Judgment: Decreased awareness of need for safety precautions  Memory: Exceptions to WFL  Long-Term Memory: Impaired  Short-Term Memory: Impaired  Problem Solving: Exceptions to WFL  Safety/Judgement: Exceptions to WFL  Insight: Moderate           Home Living:  Type of Home: Assisted living  Home Adaptive Equipment: Walker rolling or standard (Rollator)  Home Layout: One level  Home Access: Level entry  Prior Function:  Level of [place]: Independent with ADLs and functional transfers, Needs assistance with homemaking  Receives Help From: Personal care attendant  ADL Assistance: Independent  Homemaking Assistance: Needs assistance  Ambulatory Assistance: Independent  Vocational: Retired  Prior  Function Comments: family provides transportation  IADL History:     ADL:  Eating Assistance: Independent (anticipate)  Grooming Assistance: Stand by (anticipate)  Bathing Assistance: Minimal (anticipate seated position)  UE Dressing Assistance: Stand by (anticipate)  LE Dressing Assistance: Stand by (don/doff slippers seated position)  Toileting Assistance with Device: Stand by (anticipate for clothing mgmt + hygiene)  Activity Tolerance:  Endurance: Endurance does not limit participation in activity  Bed Mobility/Transfers: Bed Mobility  Bed Mobility: No    Transfers  Transfer: Yes  Transfer 1  Transfer From 1: Sit to, Stand to  Transfer to 1: Sit, Stand  Transfer Level of Assistance 1: Close supervision      Functional Mobility:  Functional Mobility  Functional Mobility Performed: Yes  Functional Mobility 1  Surface 1: Level tile  Assistance 1: Minimum assistance, Close supervision (SBA progressing to MIN A without AD, reaching for funiture, occasional LOB, mild unsteadiness)  Sitting Balance:     Standing Balance:      Modalities:     Vision:Vision - Basic Assessment  Current Vision: No visual deficits  Sensation:     Strength:  Strength Comments: No formal MMT applied. BUE strength appears at baseline for basic mobility and ADLs  Perception:  Inattention/Neglect: Appears intact  Coordination:  Movements are Fluid and Coordinated: Yes   Hand Function:  Gross Grasp: Functional  Coordination: Functional  Extremities: RUE   RUE : Within Functional Limits and LUE   LUE: Within Functional Limits        Outcome Measures:[facility] Daily Activity  Putting on and taking off regular lower body clothing: A little  Bathing (including washing, rinsing, drying): A little  Putting on and taking off regular upper body clothing: A little  Toileting, which includes using toilet, bedpan or urinal: A little  Taking care of personal grooming such as brushing teeth: A little  Eating Meals: None  Daily Activity - Total Score:  19        Education Documentation  Handouts, taught by Maranda James OT at 10/7/2024  1:56 PM.  Learner: Patient  Readiness: Acceptance  Method: Demonstration  Response: Needs Reinforcement    Precautions, taught by Maranda James OT at 10/7/2024  1:56 PM.  Learner: Patient  Readiness: Acceptance  Method: Demonstration  Response: Needs Reinforcement    Education Comments  No comments found.        OP EDUCATION:       Goals:  Encounter Problems       Encounter Problems (Active)       ADLs       Patient will complete toileting including hygiene clothing management/hygiene with supervision level of assistance and grab bars.       Start:  10/07/24    Expected End:  10/14/24               MOBILITY       Patient will perform Functional mobility mod  Household distances/Community Distances with supervision level of assistance and front wheeled walker in order to improve safety and functional mobility.       Start:  10/07/24    Expected End:  10/14/24

## 2024-10-07 NOTE — NURSING NOTE
Order received for discharge to assisted living. Iv d/cd. Home going instructions to be sent to the staff at [facility] PAS to transport pt. Pt's son is aware of discharge.

## 2024-10-07 NOTE — PROGRESS NOTES
10/07/24 1131   Discharge Planning   Living Arrangements Other (Comment)  ([facility])   Support Systems Children   Assistance Needed none   Type of Residence Private residence   Home or Post Acute Services None   Expected Discharge Disposition Home   Does the patient need discharge transport arranged? No     Met with patient, introduced self and role, patient is confused at baseline. Patient lives at [facility], admitted for fall at facility, she does have pneumonia. She states she feels much better and wants to get discharged. Call placed to [facility], they state patient is very independent in her ADLs. She leaves multiple times a day with family members, they are fine to take her back as long as she is able to care for self with min assist as they are Assisted Living facility. PT OT are on to evaluate.  Will reach out to family as well. TCC to follow.

## 2024-10-07 NOTE — DISCHARGE SUMMARY
Discharge Diagnosis  Pneumonia, unspecified organism    Issues Requiring Follow-Up  Pneumonia    This discharge took greater than 35 minutes.    Test Results Pending At Discharge  Pending Labs       Order Current Status    Blood Culture Preliminary result    Blood Culture Preliminary result            Hospital Course   84 y.o. female with PMHx s/f HTN, HLD, T2DM, hypothyroidism, dementia presenting after a fall at the nursing facility. It was an unwitnessed fall and she denies hitting her head, LOC, or being on blood thinners. She states to be feeling weak for the past few days, reports to have lost her balance. She uses a walker at baseline. She denies headache, dizziness, f/c/n/v, chest pain, sob, abd pain, changes in urinary or bowel symptoms, syncope.      ED Course (Summary):   Vitals on presentation: 100.4 F, 112 bpm, 18, 128/84, 96% on RA  Labs: CMP-glucose 127, sodium 134, creatinine 1.06  Lactate 2.4-1.9  CBC-neutrophils 7.89  UA negative  Imaging: CXR-no acute process  CT chest AP-patchy airspace disease with early lung consolidation seen adjacent to the left side of the aortic arch and within the left lower lung posteriorly concerning for multifocal pneumonia.  Cholelithiasis.  Multiple left parapelvic cysts.  Significant diverticular disease of sigmoid colon.  Interventions: Tylenol 650 mg, NS 1 L, started on Zithromax and doxycycline           10/6:                 Today the patient is awake alert and interactive appropriately.  Describes overall feeling better but still feeling very weak.  Poor appetite.  Last recorded temp of 100.7 around 5:30 AM today.  Continues to do well on room air.  Lactic acid resolved.  Blood cultures remain pending.    10/7:                Today patient remains awake alert and interactive appropriately.  Strongly desires discharge.  Remains doing well on room air and taking diet well.  WBC normal.  Blood cultures no growth.  Will be discharged on azithromycin and doxycycline  to complete course of treatment.        Review of Systems   Constitutional: Negative for fever/chills.  Positive for fatigue.   Respiratory:  Negative for cough, chest tightness, shortness of breath and wheezing.    Cardiovascular:  Negative for chest pain, palpitations and leg swelling.   Gastrointestinal:  Negative for abdominal pain, constipation, diarrhea, nausea and vomiting.   Genitourinary:  Negative for flank pain and hematuria.   Musculoskeletal:  Negative for back pain and joint swelling.   Skin:  Negative for rash and wound.   Neurological:  Positive for weakness. Negative for tremors, syncope and headaches.        Generalized weakness         Multifocal Pneumonia  -CT chest AP - patchy airspace disease with early lung consolidation seen adjacent to the left side of the aortic arch and within the left lower lung posteriorly concerning for multifocal pneumonia  -Continue antibiotic coverage with doxycycline, zithromax  -Strep, legionella urine antigen  -Sputum culture  -DuoNebs  -Pulmonary hygiene   -RT c/s appreciated  -Follow fever curve, WBCs  10/6: Blood cultures remain pending.  Urine antigens negative.  Continues on azithromycin and doxycycline.  Last temp recorded around 5:30 AM today.  Doing well on room air.     Sepsis without shock with acute end-organ dysfunction of lactic acidosis  -SIRS: fever, pulse  -suspect secondary to pneumonia  -lactate normalized after NS 1L from 2.4 >> 1.9  -normotensive BP  -Blood cultures x2 pending  -Continue antibiotic coverage with doxycycline, zithromax  -Follow fever curve, WBCs     HLD  -continue statin     Dementia  -continue home memantine     T2DM  -A1C apparently not ordered.  -not on oral meds  10/7: Blood sugars appear very reasonable only less than 100 in the mornings.  Follow-up as outpatient.             Pertinent Physical Exam At Time of Discharge  Physical Exam  Constitutional: Pleasant and cooperative. Laying in bed in no acute distress. Conversant.    Skin: Warm and dry; no obvious lesions, rashes, pallor, or jaundice. Good turgor.   Eyes: EOMI. Anicteric sclera.   ENT: Mucous membranes moist; no obvious injury or deformity appreciated.   Head and Neck: Normocephalic, atraumatic. ROM preserved.   Respiratory: Nonlabored on RA. Lungs clear to auscultation bilaterally without obvious adventitious sounds. Chest rise is equal.  Cardiovascular: RRR. No gross murmur, gallop, or rub. Extremities are warm and well-perfused with good capillary refill (< 3 seconds). No chest wall tenderness.   GI: Abdomen soft, nontender, nondistended. No obvious organomegaly appreciated. Bowel sounds are present and normoactive.  : No CVA tenderness.   MSK: No gross abnormalities appreciated. No limitations to AROM/PROM appreciated.   Extremities: No cyanosis, edema, or clubbing evident. Neurovascularly intact.   Neuro: A&Ox2. Knows her name, where she is at, does not know the year. She knows what season it is. Able to respond to questions appropriately and clearly. No acute focal neurologic deficits appreciated.  Psych: Flat affect.  Pleasant and cooperative to exam  Home Medications     Medication List      START taking these medications     azithromycin 500 mg tablet; Commonly known as: Zithromax; Take 1 tablet   (500 mg) by mouth once daily for 4 days.   doxycycline 100 mg capsule; Commonly known as: Vibramycin; Take 1   capsule (100 mg) by mouth 2 times a day for 5 days. Take with at least 8   ounces (large glass) of water, do not lie down for 30 minutes after   Mucus Relief  mg 12 hr tablet; Generic drug: guaiFENesin; Take 1   tablet (600 mg) by mouth every 12 hours if needed for congestion for up to   10 days. Do not crush, chew, or split.     CONTINUE taking these medications     Aricept 10 mg tablet; Generic drug: donepezil   atorvastatin 10 mg tablet; Commonly known as: Lipitor   memantine 5 mg tablet; Commonly known as: Namenda   mirtazapine 15 mg tablet; Commonly  known as: Remeron   Rinvoq 15 mg tablet extended release 24 hr; Generic drug: upadacitinib   ER       Outpatient Follow-Up  Discharge to assisted living.,  PCP    Saeid Jerome MD

## 2024-10-07 NOTE — PROGRESS NOTES
Physical Therapy    Physical Therapy Evaluation    Patient Name: Jody Perales  MRN: [id number]  Today's Date: 10/7/2024   Time Calculation  Start Time: 1255  Stop Time: 1305  Time Calculation (min): 10 min  2305/2305-A    Assessment/Plan   PT Assessment  PT Assessment Results: Decreased strength, Impaired balance, Decreased mobility, Decreased cognition, Impaired judgement  Rehab Prognosis: Good  Barriers to Discharge: Cognition, impaired balance with fall risk- TCC note states that family desires pt to return to [place] if needs MIN A or less as facility can provide assistance.  Evaluation/Treatment Tolerance: Patient limited by fatigue  Medical Staff Made Aware: Yes  End of Session Communication: Bedside nurse  Assessment Comment: This patient was admitted with dx of PNA and sepsis, presenting with deficits in cognition, LE strength, balance (actively losing balance posteriorly inconsistently with sit > stand and during ambulation). Currently recommend continued PT intervention due to fall risk, and use of WW and 1 person assist for mobility. Anticipate improved mobility with PT intervention, recommend low intensity rehab to improve noted deficits.  End of Session Patient Position: Up in chair, Alarm off, caregiver present  IP OR SWING BED PT PLAN  Inpatient or Swing Bed: Inpatient     10/07/24 1255   PT Plan   Treatment/Interventions Transfer training;Gait training;Balance training;Neuromuscular re-education;Strengthening;Therapeutic exercise;Therapeutic activity   PT Plan Ongoing PT   PT Frequency 4 times per week   PT Discharge Recommendations Low intensity level of continued care   Equipment Recommended upon Discharge Wheeled walker  (Patient reports availability at home though uncertain as pt is a poor historian.)   PT Recommended Transfer Status Assist x1   PT - OK to Discharge Yes  (To next level of care with appropriate assistance once medically cleared.)     Subjective     Current Problem:  1. Pneumonia,  unspecified organism  azithromycin (Zithromax) 500 mg tablet    doxycycline (Vibramycin) 100 mg capsule    guaiFENesin (Mucinex) 600 mg 12 hr tablet        Patient Active Problem List   Diagnosis    Anemia, unspecified    Osteoarthritis    Gastroesophageal reflux disease without esophagitis    Hyperlipidemia    Hypertension    Major depressive disorder, recurrent, unspecified    Fracture of bone adjacent to prosthesis    Polio (HHS-HCC)    Rheumatoid arthritis with rheumatoid factor       General Visit Information:  General  Reason for Referral: PNA, sepsis.  Referred By: Raegan Obando PA-C  Past Medical History Relevant to Rehab: PMHx: NIDDM, dementia, polio per patient report (though poor historian)  Family/Caregiver Present: No  Caregiver Feedback: PCA present in the room, states she has been moving around on her own without issue.  Co-Treatment: OT  Co-Treatment Reason: To optimize safe functional mobility with consideration of current dx.  Prior to Session Communication: Bedside nurse  Patient Position Received: Bed, 2 rail up, Alarm off, not on at start of session  General Comment: Patient alert, agreeable to participate in PT.    Home Living:  Home Living  Type of Home: Assisted living  Home Adaptive Equipment: Walker rolling or standard (Rollator and WW.)  Home Layout: One level  Home Access: Level entry    Prior Level of Function:  Prior Function Per Pt/Caregiver Report  Level of [place]: Independent with ADLs and functional transfers  Receives Help From: Personal care attendant  ADL Assistance: Independent  Homemaking Assistance: Needs assistance  Ambulatory Assistance: Independent  Vocational: Retired  Prior Function Comments: Per TCC notation, patient was I with ADLs and ambulation, family provides transportation and is supportive.    Precautions:  Precautions  Precautions Comment: Fall precautions.    Vital Signs:     Objective     Pain:  Pain Assessment  Pain Assessment: 0-10  0-10 (Numeric) Pain  Score: 0 - No pain    Cognition:  Cognition  Overall Cognitive Status: Impaired at baseline  Orientation Level: Disoriented to time, Disoriented to situation  Memory:  (Patient reports that she has a black lab at home (though lives in [facility]).)  Insight: Mild    General Assessments:  General Observation  General Observation: Instructed pt to use WW and recommended 1 person assist with mobility. Notified nurse.   Activity Tolerance  Endurance: Endurance does not limit participation in activity  Sensation  Light Touch: No apparent deficits  Strength  Strength Comments: B knees WFL. B Hip flexion 3/5; ankle strength with repeated LOB posteriorly, decreased stance time R LE.  Perception  Inattention/Neglect: Appears intact  Coordination  Movements are Fluid and Coordinated: Yes     Static Sitting Balance  Static Sitting-Balance Support: Feet supported, No upper extremity supported  Static Sitting-Level of Assistance: Distant supervision  Dynamic Sitting Balance  Dynamic Sitting-Balance Support: Feet supported, No upper extremity supported  Dynamic Sitting-Level of Assistance: Distant supervision  Dynamic Sitting-Balance: Forward lean, Reaching for objects  Static Standing Balance  Static Standing-Balance Support: No upper extremity supported  Static Standing-Level of Assistance: Close supervision  Static Standing-Comment/Number of Minutes: Static stand with eyes closed 5 seconds without significant LOB.  Dynamic Standing Balance  Dynamic Standing-Balance Support: No upper extremity supported  Dynamic Standing-Level of Assistance: Contact guard (Requires MIN A to recover from posterior LOB with mild to moderate perturbations anterior to posterior as well as from sit > stand though not consistently.)    Functional Assessments:     Bed Mobility  Bed Mobility:  (Supine > sit independent)  Transfers  Transfer:  (Sit <> stand SBA with 1 LOB posteriorly requiring stepping strategy and MIN A to recover.)  Ambulation/Gait  Training  Ambulation/Gait Training Performed:  (Ambulates with hand on wall or  wall rail with UE, unsteady laterally with occasional LOB posteriorly using stepping strategy to recover, SBA to MIN A with LOB. Patient reports this is due to a flare up of her hips, hx of polio.)          Extremity/Trunk Assessments:        RLE   RLE : Within Functional Limits  LLE   LLE : Within Functional Limits    Outcome Measures:     [facility] Basic Mobility  Turning from your back to your side while in a flat bed without using bedrails: None  Moving from lying on your back to sitting on the side of a flat bed without using bedrails: None  Moving to and from bed to chair (including a wheelchair): None  Standing up from a chair using your arms (e.g. wheelchair or bedside chair): A little  To walk in hospital room: A little  Climbing 3-5 steps with railing: Total  Basic Mobility - Total Score: 19                                                             Goals:  Encounter Problems       Encounter Problems (Active)       Pain - Adult          To improve strength, balance, safe functional mobility:        Patient will sit <> stand 3 times in 30 SECOND CHAIR RISE without posterior LOB with SBA.       Start:  10/07/24    Expected End:  10/21/24            Patient will participate in standing LE strength training with UE support PRN x 15 repetitions each with SBA.        Start:  10/07/24    Expected End:  10/21/24            Patient will ambulate with  feet without LOB with SBA including posterior steps and turns.        Start:  10/07/24    Expected End:  10/21/24                 Education Documentation  Mobility Training, taught by Valentina Milligan PT at 10/7/2024  1:40 PM.  Learner: Patient  Readiness: Acceptance  Method: Explanation  Response: Verbalizes Understanding, Needs Reinforcement    Education Comments  No comments found.

## 2024-10-07 NOTE — CARE PLAN
The patient's goals for the shift include      The clinical goals for the shift include Patient will remain free from falls and injuries throughout the shift.

## 2024-10-07 NOTE — PROGRESS NOTES
Social work consult placed for discharge planning. SW reviewed pt's chart and communicated with TCC. No SW needs foreseen at this time. SW signing off; available upon request.    Massimo Ferrer, MSW, LSW ([id number])   Care Transitions

## 2024-10-09 LAB
BACTERIA BLD CULT: NORMAL
BACTERIA BLD CULT: NORMAL

## 2025-05-28 ENCOUNTER — APPOINTMENT (OUTPATIENT)
Dept: CARDIOLOGY | Facility: HOSPITAL | Age: 85
End: 2025-05-28
Payer: MEDICARE

## 2025-05-28 ENCOUNTER — APPOINTMENT (OUTPATIENT)
Dept: RADIOLOGY | Facility: HOSPITAL | Age: 85
End: 2025-05-28
Payer: MEDICARE

## 2025-05-28 ENCOUNTER — HOSPITAL ENCOUNTER (EMERGENCY)
Facility: HOSPITAL | Age: 85
Discharge: HOME | End: 2025-05-28
Attending: EMERGENCY MEDICINE
Payer: MEDICARE

## 2025-05-28 VITALS
HEART RATE: 100 BPM | BODY MASS INDEX: 29.25 KG/M2 | HEIGHT: 60 IN | SYSTOLIC BLOOD PRESSURE: 130 MMHG | TEMPERATURE: 98.5 F | DIASTOLIC BLOOD PRESSURE: 63 MMHG | RESPIRATION RATE: 16 BRPM | OXYGEN SATURATION: 98 % | WEIGHT: 149 LBS

## 2025-05-28 DIAGNOSIS — G89.4 CHRONIC PAIN SYNDROME: Primary | ICD-10-CM

## 2025-05-28 DIAGNOSIS — R11.0 NAUSEA: ICD-10-CM

## 2025-05-28 LAB
ALBUMIN SERPL BCP-MCNC: 3.9 G/DL (ref 3.4–5)
ALP SERPL-CCNC: 68 U/L (ref 33–136)
ALT SERPL W P-5'-P-CCNC: 12 U/L (ref 7–45)
ANION GAP SERPL CALC-SCNC: 14 MMOL/L (ref 10–20)
AST SERPL W P-5'-P-CCNC: 17 U/L (ref 9–39)
ATRIAL RATE: 85 BPM
BASOPHILS # BLD AUTO: 0.03 X10*3/UL (ref 0–0.1)
BASOPHILS NFR BLD AUTO: 0.2 %
BILIRUB SERPL-MCNC: 0.6 MG/DL (ref 0–1.2)
BUN SERPL-MCNC: 12 MG/DL (ref 6–23)
CALCIUM SERPL-MCNC: 8.9 MG/DL (ref 8.6–10.3)
CARDIAC TROPONIN I PNL SERPL HS: 6 NG/L (ref 0–13)
CHLORIDE SERPL-SCNC: 104 MMOL/L (ref 98–107)
CO2 SERPL-SCNC: 23 MMOL/L (ref 21–32)
CREAT SERPL-MCNC: 0.86 MG/DL (ref 0.5–1.05)
CRP SERPL-MCNC: 4.99 MG/DL
EGFRCR SERPLBLD CKD-EPI 2021: 67 ML/MIN/1.73M*2
EOSINOPHIL # BLD AUTO: 0.03 X10*3/UL (ref 0–0.4)
EOSINOPHIL NFR BLD AUTO: 0.2 %
ERYTHROCYTE [DISTWIDTH] IN BLOOD BY AUTOMATED COUNT: 12.6 % (ref 11.5–14.5)
GLUCOSE SERPL-MCNC: 142 MG/DL (ref 74–99)
HCT VFR BLD AUTO: 37.5 % (ref 36–46)
HGB BLD-MCNC: 12.2 G/DL (ref 12–16)
IMM GRANULOCYTES # BLD AUTO: 0.04 X10*3/UL (ref 0–0.5)
IMM GRANULOCYTES NFR BLD AUTO: 0.3 % (ref 0–0.9)
LYMPHOCYTES # BLD AUTO: 0.72 X10*3/UL (ref 0.8–3)
LYMPHOCYTES NFR BLD AUTO: 5.7 %
MAGNESIUM SERPL-MCNC: 1.86 MG/DL (ref 1.6–2.4)
MCH RBC QN AUTO: 31 PG (ref 26–34)
MCHC RBC AUTO-ENTMCNC: 32.5 G/DL (ref 32–36)
MCV RBC AUTO: 95 FL (ref 80–100)
MONOCYTES # BLD AUTO: 0.81 X10*3/UL (ref 0.05–0.8)
MONOCYTES NFR BLD AUTO: 6.4 %
NEUTROPHILS # BLD AUTO: 11.07 X10*3/UL (ref 1.6–5.5)
NEUTROPHILS NFR BLD AUTO: 87.2 %
NRBC BLD-RTO: 0 /100 WBCS (ref 0–0)
P AXIS: 63 DEGREES
PHOSPHATE SERPL-MCNC: 2.1 MG/DL (ref 2.5–4.9)
PLATELET # BLD AUTO: 261 X10*3/UL (ref 150–450)
POTASSIUM SERPL-SCNC: 4.1 MMOL/L (ref 3.5–5.3)
PR INTERVAL: 167 MS
PROT SERPL-MCNC: 7.2 G/DL (ref 6.4–8.2)
Q ONSET: 249 MS
QRS COUNT: 15 BEATS
QRS DURATION: 86 MS
QT INTERVAL: 352 MS
QTC CALCULATION(BAZETT): 447 MS
QTC FREDERICIA: 413 MS
R AXIS: 60 DEGREES
RBC # BLD AUTO: 3.94 X10*6/UL (ref 4–5.2)
SODIUM SERPL-SCNC: 137 MMOL/L (ref 136–145)
T AXIS: 54 DEGREES
T OFFSET: 425 MS
VENTRICULAR RATE: 97 BPM
WBC # BLD AUTO: 12.7 X10*3/UL (ref 4.4–11.3)

## 2025-05-28 PROCEDURE — 36415 COLL VENOUS BLD VENIPUNCTURE: CPT | Performed by: EMERGENCY MEDICINE

## 2025-05-28 PROCEDURE — 84100 ASSAY OF PHOSPHORUS: CPT | Performed by: EMERGENCY MEDICINE

## 2025-05-28 PROCEDURE — 96361 HYDRATE IV INFUSION ADD-ON: CPT

## 2025-05-28 PROCEDURE — 84484 ASSAY OF TROPONIN QUANT: CPT | Performed by: EMERGENCY MEDICINE

## 2025-05-28 PROCEDURE — 85025 COMPLETE CBC W/AUTO DIFF WBC: CPT | Performed by: EMERGENCY MEDICINE

## 2025-05-28 PROCEDURE — 83735 ASSAY OF MAGNESIUM: CPT | Performed by: EMERGENCY MEDICINE

## 2025-05-28 PROCEDURE — 96375 TX/PRO/DX INJ NEW DRUG ADDON: CPT

## 2025-05-28 PROCEDURE — 96374 THER/PROPH/DIAG INJ IV PUSH: CPT

## 2025-05-28 PROCEDURE — 80053 COMPREHEN METABOLIC PANEL: CPT | Performed by: EMERGENCY MEDICINE

## 2025-05-28 PROCEDURE — 2500000004 HC RX 250 GENERAL PHARMACY W/ HCPCS (ALT 636 FOR OP/ED): Mod: JZ | Performed by: EMERGENCY MEDICINE

## 2025-05-28 PROCEDURE — 99284 EMERGENCY DEPT VISIT MOD MDM: CPT | Performed by: EMERGENCY MEDICINE

## 2025-05-28 PROCEDURE — 86140 C-REACTIVE PROTEIN: CPT | Performed by: EMERGENCY MEDICINE

## 2025-05-28 PROCEDURE — 93005 ELECTROCARDIOGRAM TRACING: CPT

## 2025-05-28 RX ORDER — MORPHINE SULFATE 4 MG/ML
4 INJECTION INTRAVENOUS ONCE
Status: COMPLETED | OUTPATIENT
Start: 2025-05-28 | End: 2025-05-28

## 2025-05-28 RX ORDER — ONDANSETRON 4 MG/1
4 TABLET, ORALLY DISINTEGRATING ORAL EVERY 8 HOURS PRN
Qty: 20 TABLET | Refills: 0 | Status: SHIPPED | OUTPATIENT
Start: 2025-05-28 | End: 2025-06-04

## 2025-05-28 RX ORDER — OXYCODONE AND ACETAMINOPHEN 5; 325 MG/1; MG/1
1 TABLET ORAL EVERY 6 HOURS PRN
Qty: 12 TABLET | Refills: 0 | Status: SHIPPED | OUTPATIENT
Start: 2025-05-28 | End: 2025-05-31

## 2025-05-28 RX ORDER — ONDANSETRON HYDROCHLORIDE 2 MG/ML
4 INJECTION, SOLUTION INTRAVENOUS ONCE
Status: COMPLETED | OUTPATIENT
Start: 2025-05-28 | End: 2025-05-28

## 2025-05-28 RX ADMIN — MORPHINE SULFATE 4 MG: 4 INJECTION INTRAVENOUS at 03:50

## 2025-05-28 RX ADMIN — SODIUM CHLORIDE 500 ML: 0.9 INJECTION, SOLUTION INTRAVENOUS at 03:50

## 2025-05-28 RX ADMIN — ONDANSETRON 4 MG: 2 INJECTION, SOLUTION INTRAMUSCULAR; INTRAVENOUS at 03:50

## 2025-05-28 ASSESSMENT — LIFESTYLE VARIABLES
TOTAL SCORE: 0
EVER HAD A DRINK FIRST THING IN THE MORNING TO STEADY YOUR NERVES TO GET RID OF A HANGOVER: NO
EVER FELT BAD OR GUILTY ABOUT YOUR DRINKING: NO
HAVE YOU EVER FELT YOU SHOULD CUT DOWN ON YOUR DRINKING: NO
HAVE PEOPLE ANNOYED YOU BY CRITICIZING YOUR DRINKING: NO

## 2025-05-28 ASSESSMENT — PAIN DESCRIPTION - LOCATION: LOCATION: HIP

## 2025-05-28 ASSESSMENT — COLUMBIA-SUICIDE SEVERITY RATING SCALE - C-SSRS
6. HAVE YOU EVER DONE ANYTHING, STARTED TO DO ANYTHING, OR PREPARED TO DO ANYTHING TO END YOUR LIFE?: NO
2. HAVE YOU ACTUALLY HAD ANY THOUGHTS OF KILLING YOURSELF?: NO
1. IN THE PAST MONTH, HAVE YOU WISHED YOU WERE DEAD OR WISHED YOU COULD GO TO SLEEP AND NOT WAKE UP?: NO

## 2025-05-28 ASSESSMENT — PAIN DESCRIPTION - DESCRIPTORS
DESCRIPTORS: SQUEEZING
DESCRIPTORS: SHOOTING;SHARP

## 2025-05-28 ASSESSMENT — PAIN - FUNCTIONAL ASSESSMENT: PAIN_FUNCTIONAL_ASSESSMENT: 0-10

## 2025-05-28 ASSESSMENT — PAIN DESCRIPTION - ORIENTATION: ORIENTATION: RIGHT

## 2025-05-28 ASSESSMENT — PAIN SCALES - GENERAL: PAINLEVEL_OUTOF10: 4

## 2025-05-28 NOTE — ED PROVIDER NOTES
Emergency Department Provider Note        MEDICAL DECISION MAKING:  Medical Decision Making       5/28/25     Chief Complaint   Patient presents with    Hip Pain      History/Exam limitations: dementia.   Additional history was obtained from patient and EMS personnel.    HPI: Jody Perales  is a 85 y.o. presents with joint pain that is chronic but states she feels nauseous.  Pain started tonight, is actually chronic, states that it is not worse than before.  However the nursing home sent her in to be checked out.  She denies any chest pain or shortness of breath.  No fevers at home.  Denies any falls or injuries.  Past medical records, Past medical history and surgical history reviewed and as documented.  History reviewed and as noted. past medical records reviewed.    Active Ambulatory Problems     Diagnosis Date Noted    Anemia, unspecified 06/17/2021    Osteoarthritis 03/23/2022    Gastroesophageal reflux disease without esophagitis 01/25/2021    Hyperlipidemia 03/08/2023    Hypertension 03/08/2023    Major depressive disorder, recurrent, unspecified 06/17/2021    Fracture of bone adjacent to prosthesis 10/05/2024    Polio ([facility]) 10/05/2024    Rheumatoid arthritis with rheumatoid factor 06/17/2021     Resolved Ambulatory Problems     Diagnosis Date Noted    Pneumonia, unspecified organism 10/05/2024     No Additional Past Medical History        Surgical History[1]     Family History[2]     Social History[3]     RX Allergies[4]     Unless otherwise stated in this report the patient's positive and negative responses for review of systems for constitutional, eyes, ENT, cardiovascular, respiratory, gastrointestinal, neurological, genitourinary, musculoskeletal, and integument systems and related systems to the presenting problem are either as stated in the HPI or were not pertinent or were negative for the symptoms and/or complaints related to the presenting medical problem.    PHYSICAL  EXAM  Triage/nursing notes and vital signs reviewed as available and as noted    Vitals:    05/28/25 0328 05/28/25 0331   BP:  143/73   Pulse: (!) 102    Resp: 16    Temp: 36.9 °C (98.5 °F)    SpO2: 98% 98%   Weight: 67.6 kg (149 lb)    Height: 1.524 m (5')            Constitutional:       Appearance: Patient not ill-appearing or toxic-appearing.   HENT:      Head: Atraumatic.      Mouth/Throat:      Mouth: Mucous membranes are moist.      Pharynx: Oropharynx is clear. No pharyngeal swelling.      Neck: No Obvious JVD. Trachea midline. No neck swelling.  Eyes:      Normal Ocular tracking  Cardiovascular:      Rate and Rhythm: Normal rate and regular rhythm.      Pulses: Normal pulses.      Heart sounds: No murmur heard.  Pulmonary:      Effort: Pulmonary effort is normal.      Breath sounds: Normal breath sounds.   Abdominal:      General: Bowel sounds are normal.      Palpations: Abdomen is soft.      Tenderness: There is no abdominal tenderness. There is no guarding or rebound.   Musculoskeletal:      Cervical back: Neck supple.      Right lower leg: No tenderness. No edema.      Left lower leg: No tenderness. No edema.      No tenderness of the extremities.  No signs of inflammatory arthropathy.     Neurovascular intact  Skin:     General: Skin is warm and dry.      Capillary Refill: Capillary refill takes less than 2 seconds.   Neurological:      General: No focal deficit present.      Mental Status: Patient is alert.  Appropriate conversant     Sensory: Gross Sensation is intact.      Motor: Gross Motor function is intact symmetrically  Psychiatric:         Mood and Affect: Mood normal.      Cooperative, no apparent risk to self or others    ED COURSE      Work-up performed to evaluate for differential diagnosis as clinically indicated   Orders Placed This Encounter   Procedures    CBC and Auto Differential    Phosphorus    Magnesium    Comprehensive metabolic panel    C-Reactive Protein    Troponin I, High  Sensitivity    Vital Signs    ECG 12 lead    Insert and maintain peripheral IV          Labs and imaging reviewed by me  and note         Labs Reviewed   CBC WITH AUTO DIFFERENTIAL - Abnormal       Result Value    WBC 12.7 (*)     nRBC 0.0      RBC 3.94 (*)     Hemoglobin 12.2      Hematocrit 37.5      MCV 95      MCH 31.0      MCHC 32.5      RDW 12.6      Platelets 261      Neutrophils % 87.2      Immature Granulocytes %, Automated 0.3      Lymphocytes % 5.7      Monocytes % 6.4      Eosinophils % 0.2      Basophils % 0.2      Neutrophils Absolute 11.07 (*)     Immature Granulocytes Absolute, Automated 0.04      Lymphocytes Absolute 0.72 (*)     Monocytes Absolute 0.81 (*)     Eosinophils Absolute 0.03      Basophils Absolute 0.03     PHOSPHORUS - Abnormal    Phosphorus 2.1 (*)    COMPREHENSIVE METABOLIC PANEL - Abnormal    Glucose 142 (*)     Sodium 137      Potassium 4.1      Chloride 104      Bicarbonate 23      Anion Gap 14      Urea Nitrogen 12      Creatinine 0.86      eGFR 67      Calcium 8.9      Albumin 3.9      Alkaline Phosphatase 68      Total Protein 7.2      AST 17      Bilirubin, Total 0.6      ALT 12     C-REACTIVE PROTEIN - Abnormal    C-Reactive Protein 4.99 (*)    MAGNESIUM - Normal    Magnesium 1.86     TROPONIN I, HIGH SENSITIVITY - Normal    Troponin I, High Sensitivity 6      Narrative:     Less than 99th percentile of normal range cutoff-  Female and children under 18 years old <14 ng/L; Male <21 ng/L: Negative  Repeat testing should be performed if clinically indicated.     Female and children under 18 years old 14-50 ng/L; Male 21-50 ng/L:  Consistent with possible cardiac damage and possible increased clinical   risk. Serial measurements may help to assess extent of myocardial damage.     >50 ng/L: Consistent with cardiac damage, increased clinical risk and  myocardial infarction. Serial measurements may help assess extent of   myocardial damage.      NOTE: Children less than 1 year old  may have higher baseline troponin   levels and results should be interpreted in conjunction with the overall   clinical context.     NOTE: Troponin I testing is performed using a different   testing methodology at [facility] than at other   [facility] hospitals. Direct result comparisons should only   be made within the same method.        No orders to display            Pt course which Intervention and treatment included :    Procedure  Procedures    Medications   sodium chloride 0.9 % bolus 500 mL (500 mL intravenous New Bag 5/28/25 0350)   morphine injection 4 mg (4 mg intravenous Given 5/28/25 0350)   ondansetron (Zofran) injection 4 mg (4 mg intravenous Given 5/28/25 0350)        ED Course as of 05/28/25 0455   Wed May 28, 2025   0420 Twelve-lead EKG notes sinus rhythm 97 bpm.  Normal morphology overall, no ST elevations or depressions. [ML]   0454 Patient was seen and examined.  She does have some mild inflammatory markers, but there is no inflammatory arthropathy.  Abdomen is soft.  Vital signs are stable.  She is feeling better after medications.  Patient will be given medication to help with her chronic pain, advised to follow-up with PCP. [ML]      ED Course User Index  [ML] Marty R Lejeune, DO         Diagnoses as of 05/28/25 0455   Chronic pain syndrome   Nausea          DISPOSITION: Patient is stable for discharge.  Based upon my history, physical exam, evaluation and judgement regarding the aforementioned differentials, at the time of this assessment, I do not see evidence to support the presence of any life, neurologic, or limb threatening pathology in my considered differentials. Alternate non life threatening pathology has been considered, and has been ruled out based upon the findings of my evaluation. While there may be remaining pathology considered within the differential, this is not life threatening, not limb threatening, and does not warrant further emergent evaluation or treatment  at this time.  Shared decision making made with the patient as applicable.  It is my judgement that further treatment and evaluation can safely proceed in the outpatient setting. Shared decision making was utilized to arrive at all clinical decisions. At this time I do not see evidence of an emergency medical condition based upon my medical screening examination.  All imaging and laboratory results were discussed with the patient in detail including acute as well as incidental findings.  I discussed the differential, results and discharge plan with the patient and/or family/friend/caregiver if present. Education and reassurance provided regards to presumed diagnosis. I emphasized the importance of follow-up with the physician I referred them to in the timeframe recommended. I explained reasons for the patient to return to the Emergency Department. Additional verbal discharge instructions were also given and discussed with the patient to supplement those generated by the EMR. We also discussed medications that were prescribed (if any) including common side effects and interactions as well as proper dosing and administration. The patient was advised to abstain from driving, operating heavy machinery or making significant decisions while taking medications such as opiates and muscle relaxers that may impair this. All questions were addressed. They understand return precautions and discharge instructions. The patient and/or family/friend/caregiver expressed understanding    1. Chronic pain syndrome  oxyCODONE-acetaminophen (Percocet) 5-325 mg tablet      2. Nausea  ondansetron ODT (Zofran-ODT) 4 mg disintegrating tablet         -------------------------------------------------------------------    05/28/25 at 3:40 AM - Marty R LeJeune, DO   Internal & Emergency Medicine            [1]   Past Surgical History:  Procedure Laterality Date    OTHER SURGICAL HISTORY  07/08/2020    Hysterectomy    OTHER SURGICAL HISTORY   07/08/2020    Knee replacement   [2] No family history on file.  [3]   Social History  Tobacco Use    Smoking status: Never     Passive exposure: Never    Smokeless tobacco: Never   Vaping Use    Vaping status: Never Used   Substance Use Topics    Alcohol use: Defer    Drug use: Defer   [4]   Allergies  Allergen Reactions    Cipro [Ciprofloxacin Hcl] Rash    Keflex [Cephalexin] Rash    Levaquin [Levofloxacin] Rash    Neosporin Af [Miconazole Nitrate] Rash        Marty R Lejeune, DO  Resident  05/28/25 0282

## 2025-06-11 ENCOUNTER — LAB REQUISITION (OUTPATIENT)
Dept: LAB | Facility: HOSPITAL | Age: 85
End: 2025-06-11
Payer: MEDICARE

## 2025-06-11 DIAGNOSIS — G30.9 ALZHEIMER'S DISEASE, UNSPECIFIED (CODE): ICD-10-CM

## 2025-06-11 LAB
25(OH)D3 SERPL-MCNC: 38 NG/ML (ref 30–100)
ANION GAP SERPL CALC-SCNC: 14 MMOL/L (ref 10–20)
BUN SERPL-MCNC: 12 MG/DL (ref 6–23)
CALCIUM SERPL-MCNC: 9 MG/DL (ref 8.6–10.3)
CHLORIDE SERPL-SCNC: 104 MMOL/L (ref 98–107)
CO2 SERPL-SCNC: 26 MMOL/L (ref 21–32)
CREAT SERPL-MCNC: 0.77 MG/DL (ref 0.5–1.05)
EGFRCR SERPLBLD CKD-EPI 2021: 76 ML/MIN/1.73M*2
ERYTHROCYTE [DISTWIDTH] IN BLOOD BY AUTOMATED COUNT: 12.8 % (ref 11.5–14.5)
GLUCOSE SERPL-MCNC: 115 MG/DL (ref 74–99)
HCT VFR BLD AUTO: 38.3 % (ref 36–46)
HGB BLD-MCNC: 12.1 G/DL (ref 12–16)
MCH RBC QN AUTO: 30.6 PG (ref 26–34)
MCHC RBC AUTO-ENTMCNC: 31.6 G/DL (ref 32–36)
MCV RBC AUTO: 97 FL (ref 80–100)
NRBC BLD-RTO: 0 /100 WBCS (ref 0–0)
PLATELET # BLD AUTO: 316 X10*3/UL (ref 150–450)
POTASSIUM SERPL-SCNC: 5 MMOL/L (ref 3.5–5.3)
RBC # BLD AUTO: 3.95 X10*6/UL (ref 4–5.2)
SODIUM SERPL-SCNC: 139 MMOL/L (ref 136–145)
TSH SERPL-ACNC: 2.51 MIU/L (ref 0.44–3.98)
WBC # BLD AUTO: 9.3 X10*3/UL (ref 4.4–11.3)

## 2025-06-11 PROCEDURE — 82306 VITAMIN D 25 HYDROXY: CPT | Mod: OUT,PORLAB | Performed by: NURSE PRACTITIONER

## 2025-06-11 PROCEDURE — 85027 COMPLETE CBC AUTOMATED: CPT | Mod: OUT | Performed by: NURSE PRACTITIONER

## 2025-06-11 PROCEDURE — 82374 ASSAY BLOOD CARBON DIOXIDE: CPT | Mod: OUT | Performed by: NURSE PRACTITIONER

## 2025-06-11 PROCEDURE — 36415 COLL VENOUS BLD VENIPUNCTURE: CPT | Mod: OUT | Performed by: NURSE PRACTITIONER

## 2025-06-11 PROCEDURE — 84443 ASSAY THYROID STIM HORMONE: CPT | Mod: OUT | Performed by: NURSE PRACTITIONER
